# Patient Record
Sex: MALE | Race: WHITE | NOT HISPANIC OR LATINO | Employment: OTHER | URBAN - METROPOLITAN AREA
[De-identification: names, ages, dates, MRNs, and addresses within clinical notes are randomized per-mention and may not be internally consistent; named-entity substitution may affect disease eponyms.]

---

## 2017-02-01 ENCOUNTER — APPOINTMENT (OUTPATIENT)
Dept: GENERAL RADIOLOGY | Facility: HOSPITAL | Age: 82
End: 2017-02-01

## 2017-02-01 ENCOUNTER — HOSPITAL ENCOUNTER (INPATIENT)
Facility: HOSPITAL | Age: 82
LOS: 3 days | Discharge: HOME | End: 2017-02-04
Attending: EMERGENCY MEDICINE | Admitting: INTERNAL MEDICINE

## 2017-02-01 ENCOUNTER — APPOINTMENT (OUTPATIENT)
Dept: CT IMAGING | Facility: HOSPITAL | Age: 82
End: 2017-02-01

## 2017-02-01 DIAGNOSIS — J44.1 COPD EXACERBATION (HCC): Primary | ICD-10-CM

## 2017-02-01 DIAGNOSIS — I20.0 UNSTABLE ANGINA (HCC): ICD-10-CM

## 2017-02-01 DIAGNOSIS — R09.02 HYPOXIA: ICD-10-CM

## 2017-02-01 PROBLEM — N18.9 ACUTE KIDNEY INJURY SUPERIMPOSED ON CHRONIC KIDNEY DISEASE (HCC): Status: ACTIVE | Noted: 2017-02-01

## 2017-02-01 PROBLEM — K21.9 GERD (GASTROESOPHAGEAL REFLUX DISEASE): Status: ACTIVE | Noted: 2017-02-01

## 2017-02-01 PROBLEM — R55 SYNCOPE: Status: ACTIVE | Noted: 2017-02-01

## 2017-02-01 PROBLEM — N17.9 AKI (ACUTE KIDNEY INJURY) (HCC): Status: ACTIVE | Noted: 2017-02-01

## 2017-02-01 PROBLEM — E78.5 HYPERLIPIDEMIA: Status: ACTIVE | Noted: 2017-02-01

## 2017-02-01 PROBLEM — N17.9 ACUTE KIDNEY INJURY SUPERIMPOSED ON CHRONIC KIDNEY DISEASE (HCC): Status: ACTIVE | Noted: 2017-02-01

## 2017-02-01 PROBLEM — I10 HYPERTENSION: Status: ACTIVE | Noted: 2017-02-01

## 2017-02-01 PROBLEM — Z87.891 HISTORY OF TOBACCO ABUSE: Status: ACTIVE | Noted: 2017-02-01

## 2017-02-01 LAB
ALBUMIN SERPL-MCNC: 4.2 G/DL (ref 3.2–4.8)
ALBUMIN/GLOB SERPL: 1.6 G/DL (ref 1.5–2.5)
ALP SERPL-CCNC: 41 U/L (ref 25–100)
ALT SERPL W P-5'-P-CCNC: 21 U/L (ref 7–40)
ANION GAP SERPL CALCULATED.3IONS-SCNC: 7 MMOL/L (ref 3–11)
AST SERPL-CCNC: 30 U/L (ref 0–33)
BASOPHILS # BLD AUTO: 0.01 10*3/MM3 (ref 0–0.2)
BASOPHILS NFR BLD AUTO: 0.2 % (ref 0–1)
BILIRUB SERPL-MCNC: 0.4 MG/DL (ref 0.3–1.2)
BNP SERPL-MCNC: 73 PG/ML (ref 0–100)
BUN BLD-MCNC: 32 MG/DL (ref 9–23)
BUN/CREAT SERPL: 21.3 (ref 7–25)
CALCIUM SPEC-SCNC: 9.8 MG/DL (ref 8.7–10.4)
CHLORIDE SERPL-SCNC: 102 MMOL/L (ref 99–109)
CO2 SERPL-SCNC: 29 MMOL/L (ref 20–31)
CREAT BLD-MCNC: 1.5 MG/DL (ref 0.6–1.3)
D DIMER PPP FEU-MCNC: 1.23 MG/L (FEU) (ref 0–0.5)
DEPRECATED RDW RBC AUTO: 45.3 FL (ref 37–54)
EOSINOPHIL # BLD AUTO: 0.12 10*3/MM3 (ref 0.1–0.3)
EOSINOPHIL NFR BLD AUTO: 2.3 % (ref 0–3)
ERYTHROCYTE [DISTWIDTH] IN BLOOD BY AUTOMATED COUNT: 13.5 % (ref 11.3–14.5)
GFR SERPL CREATININE-BSD FRML MDRD: 45 ML/MIN/1.73
GLOBULIN UR ELPH-MCNC: 2.7 GM/DL
GLUCOSE BLD-MCNC: 96 MG/DL (ref 70–100)
HCT VFR BLD AUTO: 42.6 % (ref 38.9–50.9)
HGB BLD-MCNC: 13.6 G/DL (ref 13.1–17.5)
HOLD SPECIMEN: NORMAL
HOLD SPECIMEN: NORMAL
IMM GRANULOCYTES # BLD: 0.01 10*3/MM3 (ref 0–0.03)
IMM GRANULOCYTES NFR BLD: 0.2 % (ref 0–0.6)
LIPASE SERPL-CCNC: 48 U/L (ref 6–51)
LYMPHOCYTES # BLD AUTO: 0.65 10*3/MM3 (ref 0.6–4.8)
LYMPHOCYTES NFR BLD AUTO: 12.5 % (ref 24–44)
MCH RBC QN AUTO: 29.2 PG (ref 27–31)
MCHC RBC AUTO-ENTMCNC: 31.9 G/DL (ref 32–36)
MCV RBC AUTO: 91.6 FL (ref 80–99)
MONOCYTES # BLD AUTO: 0.42 10*3/MM3 (ref 0–1)
MONOCYTES NFR BLD AUTO: 8.1 % (ref 0–12)
NEUTROPHILS # BLD AUTO: 4 10*3/MM3 (ref 1.5–8.3)
NEUTROPHILS NFR BLD AUTO: 76.7 % (ref 41–71)
PLATELET # BLD AUTO: 210 10*3/MM3 (ref 150–450)
PMV BLD AUTO: 9.7 FL (ref 6–12)
POTASSIUM BLD-SCNC: 5.1 MMOL/L (ref 3.5–5.5)
PROT SERPL-MCNC: 6.9 G/DL (ref 5.7–8.2)
RBC # BLD AUTO: 4.65 10*6/MM3 (ref 4.2–5.76)
SODIUM BLD-SCNC: 138 MMOL/L (ref 132–146)
TROPONIN I SERPL-MCNC: 0.01 NG/ML (ref 0–0.07)
TROPONIN I SERPL-MCNC: <0.006 NG/ML
TROPONIN I SERPL-MCNC: <0.006 NG/ML
WBC NRBC COR # BLD: 5.21 10*3/MM3 (ref 3.5–10.8)
WHOLE BLOOD HOLD SPECIMEN: NORMAL
WHOLE BLOOD HOLD SPECIMEN: NORMAL

## 2017-02-01 PROCEDURE — 25010000002 HEPARIN (PORCINE) PER 1000 UNITS: Performed by: NURSE PRACTITIONER

## 2017-02-01 PROCEDURE — 0 IOPAMIDOL PER 1 ML: Performed by: EMERGENCY MEDICINE

## 2017-02-01 PROCEDURE — 84484 ASSAY OF TROPONIN QUANT: CPT | Performed by: PHYSICIAN ASSISTANT

## 2017-02-01 PROCEDURE — 85025 COMPLETE CBC W/AUTO DIFF WBC: CPT | Performed by: EMERGENCY MEDICINE

## 2017-02-01 PROCEDURE — 93005 ELECTROCARDIOGRAM TRACING: CPT | Performed by: EMERGENCY MEDICINE

## 2017-02-01 PROCEDURE — 84484 ASSAY OF TROPONIN QUANT: CPT

## 2017-02-01 PROCEDURE — 83880 ASSAY OF NATRIURETIC PEPTIDE: CPT | Performed by: EMERGENCY MEDICINE

## 2017-02-01 PROCEDURE — 36415 COLL VENOUS BLD VENIPUNCTURE: CPT

## 2017-02-01 PROCEDURE — 25010000002 METHYLPREDNISOLONE PER 40 MG: Performed by: PHYSICIAN ASSISTANT

## 2017-02-01 PROCEDURE — 71275 CT ANGIOGRAPHY CHEST: CPT

## 2017-02-01 PROCEDURE — 25010000002 METHYLPREDNISOLONE PER 125 MG: Performed by: NURSE PRACTITIONER

## 2017-02-01 PROCEDURE — 99223 1ST HOSP IP/OBS HIGH 75: CPT | Performed by: INTERNAL MEDICINE

## 2017-02-01 PROCEDURE — 80053 COMPREHEN METABOLIC PANEL: CPT | Performed by: EMERGENCY MEDICINE

## 2017-02-01 PROCEDURE — 94640 AIRWAY INHALATION TREATMENT: CPT

## 2017-02-01 PROCEDURE — 84484 ASSAY OF TROPONIN QUANT: CPT | Performed by: NURSE PRACTITIONER

## 2017-02-01 PROCEDURE — 71010 HC CHEST PA OR AP: CPT

## 2017-02-01 PROCEDURE — 83690 ASSAY OF LIPASE: CPT | Performed by: EMERGENCY MEDICINE

## 2017-02-01 PROCEDURE — 25010000002 ONDANSETRON PER 1 MG: Performed by: PHYSICIAN ASSISTANT

## 2017-02-01 PROCEDURE — 85379 FIBRIN DEGRADATION QUANT: CPT | Performed by: PHYSICIAN ASSISTANT

## 2017-02-01 PROCEDURE — 99285 EMERGENCY DEPT VISIT HI MDM: CPT

## 2017-02-01 PROCEDURE — 94760 N-INVAS EAR/PLS OXIMETRY 1: CPT

## 2017-02-01 RX ORDER — SODIUM CHLORIDE 9 MG/ML
100 INJECTION, SOLUTION INTRAVENOUS CONTINUOUS
Status: DISCONTINUED | OUTPATIENT
Start: 2017-02-01 | End: 2017-02-02

## 2017-02-01 RX ORDER — ONDANSETRON 2 MG/ML
4 INJECTION INTRAMUSCULAR; INTRAVENOUS EVERY 6 HOURS PRN
Status: DISCONTINUED | OUTPATIENT
Start: 2017-02-01 | End: 2017-02-04 | Stop reason: HOSPADM

## 2017-02-01 RX ORDER — ASPIRIN 81 MG/1
81 TABLET ORAL DAILY
Status: DISCONTINUED | OUTPATIENT
Start: 2017-02-02 | End: 2017-02-04 | Stop reason: HOSPADM

## 2017-02-01 RX ORDER — HEPARIN SODIUM 5000 [USP'U]/ML
5000 INJECTION, SOLUTION INTRAVENOUS; SUBCUTANEOUS EVERY 8 HOURS SCHEDULED
Status: DISCONTINUED | OUTPATIENT
Start: 2017-02-01 | End: 2017-02-02

## 2017-02-01 RX ORDER — IPRATROPIUM BROMIDE AND ALBUTEROL SULFATE 2.5; .5 MG/3ML; MG/3ML
3 SOLUTION RESPIRATORY (INHALATION) EVERY 4 HOURS PRN
Status: DISCONTINUED | OUTPATIENT
Start: 2017-02-01 | End: 2017-02-04 | Stop reason: HOSPADM

## 2017-02-01 RX ORDER — HYDROMORPHONE HYDROCHLORIDE 1 MG/ML
0.5 INJECTION, SOLUTION INTRAMUSCULAR; INTRAVENOUS; SUBCUTANEOUS ONCE
Status: DISCONTINUED | OUTPATIENT
Start: 2017-02-01 | End: 2017-02-01

## 2017-02-01 RX ORDER — SODIUM CHLORIDE 9 MG/ML
125 INJECTION, SOLUTION INTRAVENOUS CONTINUOUS
Status: DISCONTINUED | OUTPATIENT
Start: 2017-02-01 | End: 2017-02-04 | Stop reason: HOSPADM

## 2017-02-01 RX ORDER — IPRATROPIUM BROMIDE AND ALBUTEROL SULFATE 2.5; .5 MG/3ML; MG/3ML
3 SOLUTION RESPIRATORY (INHALATION)
Status: DISCONTINUED | OUTPATIENT
Start: 2017-02-01 | End: 2017-02-04 | Stop reason: HOSPADM

## 2017-02-01 RX ORDER — OMEPRAZOLE 20 MG/1
20 CAPSULE, DELAYED RELEASE ORAL DAILY
COMMUNITY

## 2017-02-01 RX ORDER — ASPIRIN 81 MG/1
81 TABLET ORAL DAILY
Status: ON HOLD | COMMUNITY
End: 2017-02-03

## 2017-02-01 RX ORDER — ONDANSETRON 2 MG/ML
4 INJECTION INTRAMUSCULAR; INTRAVENOUS ONCE
Status: COMPLETED | OUTPATIENT
Start: 2017-02-01 | End: 2017-02-01

## 2017-02-01 RX ORDER — ASPIRIN 81 MG/1
324 TABLET, CHEWABLE ORAL ONCE
Status: COMPLETED | OUTPATIENT
Start: 2017-02-01 | End: 2017-02-01

## 2017-02-01 RX ORDER — IPRATROPIUM BROMIDE AND ALBUTEROL SULFATE 2.5; .5 MG/3ML; MG/3ML
3 SOLUTION RESPIRATORY (INHALATION) ONCE
Status: COMPLETED | OUTPATIENT
Start: 2017-02-01 | End: 2017-02-01

## 2017-02-01 RX ORDER — SIMVASTATIN 40 MG
40 TABLET ORAL NIGHTLY
COMMUNITY
End: 2017-02-04 | Stop reason: HOSPADM

## 2017-02-01 RX ORDER — LISINOPRIL 20 MG/1
20 TABLET ORAL DAILY
Status: ON HOLD | COMMUNITY
End: 2017-02-20

## 2017-02-01 RX ORDER — SODIUM CHLORIDE 0.9 % (FLUSH) 0.9 %
1-10 SYRINGE (ML) INJECTION AS NEEDED
Status: DISCONTINUED | OUTPATIENT
Start: 2017-02-01 | End: 2017-02-04 | Stop reason: HOSPADM

## 2017-02-01 RX ORDER — PANTOPRAZOLE SODIUM 40 MG/1
40 TABLET, DELAYED RELEASE ORAL
Status: DISCONTINUED | OUTPATIENT
Start: 2017-02-02 | End: 2017-02-04 | Stop reason: HOSPADM

## 2017-02-01 RX ORDER — ATORVASTATIN CALCIUM 20 MG/1
20 TABLET, FILM COATED ORAL DAILY
Status: DISCONTINUED | OUTPATIENT
Start: 2017-02-02 | End: 2017-02-01

## 2017-02-01 RX ORDER — ATORVASTATIN CALCIUM 40 MG/1
80 TABLET, FILM COATED ORAL DAILY
Status: DISCONTINUED | OUTPATIENT
Start: 2017-02-02 | End: 2017-02-03

## 2017-02-01 RX ORDER — METHYLPREDNISOLONE SODIUM SUCCINATE 40 MG/ML
80 INJECTION, POWDER, LYOPHILIZED, FOR SOLUTION INTRAMUSCULAR; INTRAVENOUS ONCE
Status: COMPLETED | OUTPATIENT
Start: 2017-02-01 | End: 2017-02-01

## 2017-02-01 RX ORDER — METHYLPREDNISOLONE SODIUM SUCCINATE 125 MG/2ML
40 INJECTION, POWDER, LYOPHILIZED, FOR SOLUTION INTRAMUSCULAR; INTRAVENOUS EVERY 8 HOURS
Status: DISCONTINUED | OUTPATIENT
Start: 2017-02-01 | End: 2017-02-02

## 2017-02-01 RX ORDER — SODIUM CHLORIDE 0.9 % (FLUSH) 0.9 %
10 SYRINGE (ML) INJECTION AS NEEDED
Status: DISCONTINUED | OUTPATIENT
Start: 2017-02-01 | End: 2017-02-04 | Stop reason: HOSPADM

## 2017-02-01 RX ORDER — ACETAMINOPHEN 325 MG/1
650 TABLET ORAL EVERY 6 HOURS PRN
Status: DISCONTINUED | OUTPATIENT
Start: 2017-02-01 | End: 2017-02-04 | Stop reason: HOSPADM

## 2017-02-01 RX ADMIN — ONDANSETRON 4 MG: 2 INJECTION INTRAMUSCULAR; INTRAVENOUS at 18:06

## 2017-02-01 RX ADMIN — ASPIRIN 81 MG 324 MG: 81 TABLET ORAL at 14:25

## 2017-02-01 RX ADMIN — SODIUM CHLORIDE 1000 ML: 9 INJECTION, SOLUTION INTRAVENOUS at 15:52

## 2017-02-01 RX ADMIN — IOPAMIDOL 50 ML: 755 INJECTION, SOLUTION INTRAVENOUS at 16:26

## 2017-02-01 RX ADMIN — SODIUM CHLORIDE 100 ML/HR: 9 INJECTION, SOLUTION INTRAVENOUS at 22:23

## 2017-02-01 RX ADMIN — ALBUTEROL SULFATE 2.5 MG: 2.5 SOLUTION RESPIRATORY (INHALATION) at 18:40

## 2017-02-01 RX ADMIN — METHYLPREDNISOLONE SODIUM SUCCINATE 40 MG: 125 INJECTION, POWDER, FOR SOLUTION INTRAMUSCULAR; INTRAVENOUS at 22:22

## 2017-02-01 RX ADMIN — SODIUM CHLORIDE 125 ML/HR: 9 INJECTION, SOLUTION INTRAVENOUS at 17:59

## 2017-02-01 RX ADMIN — IPRATROPIUM BROMIDE AND ALBUTEROL SULFATE 3 ML: .5; 3 SOLUTION RESPIRATORY (INHALATION) at 17:14

## 2017-02-01 RX ADMIN — METHYLPREDNISOLONE SODIUM SUCCINATE 80 MG: 40 INJECTION, POWDER, FOR SOLUTION INTRAMUSCULAR; INTRAVENOUS at 18:05

## 2017-02-01 RX ADMIN — HEPARIN SODIUM 5000 UNITS: 5000 INJECTION, SOLUTION INTRAVENOUS; SUBCUTANEOUS at 22:22

## 2017-02-01 NOTE — ED PROVIDER NOTES
Subjective   HPI Comments: Patient does not have a history of COPD he is aware of.  He quit smoking about 15 years ago.    Patient is a 81 y.o. male presenting with shortness of breath.   History provided by:  Patient   used: No    Shortness of Breath   Severity:  Moderate  Onset quality:  Gradual  Duration:  5 days  Timing:  Intermittent  Progression:  Waxing and waning  Chronicity:  New  Context: activity and URI    Relieved by:  Rest  Worsened by:  Exertion  Ineffective treatments:  None tried  Associated symptoms: chest pain (but pain is random and nothing increases or decreases pain. ), cough (productive with little blood), hemoptysis (small amount bright red. ) and wheezing    Associated symptoms: no abdominal pain, no diaphoresis, no ear pain, no headaches, no rash, no sputum production, no swollen glands and no vomiting        Review of Systems   Constitutional: Negative for diaphoresis.   HENT: Negative for ear pain.    Respiratory: Positive for cough (productive with little blood), hemoptysis (small amount bright red. ) and shortness of breath. Negative for sputum production.    Cardiovascular: Positive for chest pain (but pain is random and nothing increases or decreases pain. ).   Gastrointestinal: Negative for abdominal pain, blood in stool, diarrhea, nausea and vomiting.   Genitourinary: Negative for dysuria, frequency, hematuria and urgency.   Skin: Negative for rash.   Neurological: Negative for tremors, syncope and headaches.   Psychiatric/Behavioral: Negative.        Past Medical History   Diagnosis Date   • GERD (gastroesophageal reflux disease)    • Hypertension        No Known Allergies    Past Surgical History   Procedure Laterality Date   • Abdominal surgery         History reviewed. No pertinent family history.    Social History     Social History   • Marital status:      Spouse name: N/A   • Number of children: N/A   • Years of education: N/A     Social History  Main Topics   • Smoking status: Former Smoker   • Smokeless tobacco: None      Comment: QUIT 15 YEARS AGO   • Alcohol use No   • Drug use: No   • Sexual activity: Not Asked     Other Topics Concern   • None     Social History Narrative   • None           Objective   Physical Exam   Constitutional: He is oriented to person, place, and time. He appears well-developed and well-nourished.   Thin cachetic   HENT:   Head: Normocephalic and atraumatic.   Right Ear: External ear normal.   Left Ear: External ear normal.   Nose: Nose normal.   Mouth/Throat: Oropharynx is clear and moist.   Eyes: Conjunctivae and EOM are normal. Pupils are equal, round, and reactive to light. No scleral icterus.   Neck: Normal range of motion. No thyromegaly present.   Cardiovascular: Normal rate, regular rhythm, normal heart sounds and intact distal pulses.    Pulmonary/Chest: No respiratory distress. He has wheezes. He has no rales. He exhibits no tenderness.   Abdominal: Soft. Bowel sounds are normal. He exhibits no distension. There is no tenderness.   Musculoskeletal: Normal range of motion.   Lymphadenopathy:     He has no cervical adenopathy.   Neurological: He is alert and oriented to person, place, and time. He has normal reflexes. He displays normal reflexes. No cranial nerve deficit. Coordination normal.   Skin: Skin is warm and dry.   Psychiatric: He has a normal mood and affect. His behavior is normal. Judgment and thought content normal.   Nursing note and vitals reviewed.      Procedures         ED Course  ED Course   Value Comment By Time   XR Chest 1 View (Reviewed) Zach Davalos MD 02/01 1486   XR Chest 1 View (Reviewed) Zach Davalos MD 02/01 3510        Recent Results (from the past 24 hour(s))   Comprehensive Metabolic Panel    Collection Time: 02/01/17  2:08 PM   Result Value Ref Range    Glucose 96 70 - 100 mg/dL    BUN 32 (H) 9 - 23 mg/dL    Creatinine 1.50 (H) 0.60 - 1.30 mg/dL    Sodium 138 132 - 146 mmol/L     Potassium 5.1 3.5 - 5.5 mmol/L    Chloride 102 99 - 109 mmol/L    CO2 29.0 20.0 - 31.0 mmol/L    Calcium 9.8 8.7 - 10.4 mg/dL    Total Protein 6.9 5.7 - 8.2 g/dL    Albumin 4.20 3.20 - 4.80 g/dL    ALT (SGPT) 21 7 - 40 U/L    AST (SGOT) 30 0 - 33 U/L    Alkaline Phosphatase 41 25 - 100 U/L    Total Bilirubin 0.4 0.3 - 1.2 mg/dL    eGFR Non African Amer 45 (L) >60 mL/min/1.73    Globulin 2.7 gm/dL    A/G Ratio 1.6 1.5 - 2.5 g/dL    BUN/Creatinine Ratio 21.3 7.0 - 25.0    Anion Gap 7.0 3.0 - 11.0 mmol/L   Lipase    Collection Time: 02/01/17  2:08 PM   Result Value Ref Range    Lipase 48 6 - 51 U/L   BNP    Collection Time: 02/01/17  2:08 PM   Result Value Ref Range    BNP 73.0 0.0 - 100.0 pg/mL   CBC Auto Differential    Collection Time: 02/01/17  2:08 PM   Result Value Ref Range    WBC 5.21 3.50 - 10.80 10*3/mm3    RBC 4.65 4.20 - 5.76 10*6/mm3    Hemoglobin 13.6 13.1 - 17.5 g/dL    Hematocrit 42.6 38.9 - 50.9 %    MCV 91.6 80.0 - 99.0 fL    MCH 29.2 27.0 - 31.0 pg    MCHC 31.9 (L) 32.0 - 36.0 g/dL    RDW 13.5 11.3 - 14.5 %    RDW-SD 45.3 37.0 - 54.0 fl    MPV 9.7 6.0 - 12.0 fL    Platelets 210 150 - 450 10*3/mm3    Neutrophil % 76.7 (H) 41.0 - 71.0 %    Lymphocyte % 12.5 (L) 24.0 - 44.0 %    Monocyte % 8.1 0.0 - 12.0 %    Eosinophil % 2.3 0.0 - 3.0 %    Basophil % 0.2 0.0 - 1.0 %    Immature Grans % 0.2 0.0 - 0.6 %    Neutrophils, Absolute 4.00 1.50 - 8.30 10*3/mm3    Lymphocytes, Absolute 0.65 0.60 - 4.80 10*3/mm3    Monocytes, Absolute 0.42 0.00 - 1.00 10*3/mm3    Eosinophils, Absolute 0.12 0.10 - 0.30 10*3/mm3    Basophils, Absolute 0.01 0.00 - 0.20 10*3/mm3    Immature Grans, Absolute 0.01 0.00 - 0.03 10*3/mm3   D-dimer, Quantitative    Collection Time: 02/01/17  2:08 PM   Result Value Ref Range    D-Dimer, Quantitative 1.23 (H) 0.00 - 0.50 mg/L (FEU)   Troponin    Collection Time: 02/01/17  2:08 PM   Result Value Ref Range    Troponin I <0.006 <=0.040 ng/mL   POC Troponin, Rapid    Collection Time: 02/01/17   5:23 PM   Result Value Ref Range    Troponin I 0.01 0.00 - 0.07 ng/mL     Note: In addition to lab results from this visit, the labs listed above may include labs taken at another facility or during a different encounter within the last 24 hours. Please correlate lab times with ED admission and discharge times for further clarification of the services performed during this visit.    XR Chest 1 View   Final Result   Chronic change; no active disease.       D:  02/01/2017   E:  02/01/2017           This report was finalized on 2/1/2017 4:24 PM by Dr. Aniket Singh MD.          CT Angiogram Chest With Contrast    (Results Pending)     Vitals:    02/01/17 1715 02/01/17 1730 02/01/17 1745 02/01/17 1746   BP: 138/66 128/67 139/63    BP Location:       Patient Position:       Pulse: 68 68  68   Resp:       Temp:       TempSrc:       SpO2: 94% 91%  (!) 87%   Weight:       Height:         Medications   sodium chloride 0.9 % flush 10 mL (not administered)   sodium chloride 0.9 % infusion (125 mL/hr Intravenous New Bag 2/1/17 1759)   albuterol (PROVENTIL) nebulizer solution 0.5% 2.5 mg/0.5mL (not administered)   aspirin chewable tablet 324 mg (324 mg Oral Given 2/1/17 1425)   sodium chloride 0.9 % bolus 1,000 mL (0 mL Intravenous Stopped 2/1/17 1743)   iopamidol (ISOVUE-370) 76 % injection 50 mL (50 mL Intravenous Given 2/1/17 1626)   methylPREDNISolone sodium succinate (SOLU-Medrol) injection 80 mg (80 mg Intravenous Given 2/1/17 1805)   ipratropium-albuterol (DUO-NEB) nebulizer solution 3 mL (3 mL Nebulization Given 2/1/17 1714)   ondansetron (ZOFRAN) injection 4 mg (4 mg Intravenous Given 2/1/17 1806)     ECG/EMG Results (last 24 hours)     Procedure Component Value Units Date/Time    ECG 12 Lead [[id number]] Collected:  02/01/17 1712     Updated:  02/01/17 1714    ECG 12 Lead [[id number]] Collected:  02/01/17 1406     Updated:  02/01/17 1716    Narrative:       Test Reason : cp  Blood Pressure : **/** mmHG  Vent. Rate : 068 BPM      Atrial Rate : 068 BPM     P-R Int : 162 ms          QRS Dur : 076 ms      QT Int : 416 ms       P-R-T Axes : 081 -01 056 degrees     QTc Int : 442 ms    Normal sinus rhythm  Normal ECG  No previous ECGs available  Confirmed by TRAVIS AUGUSTE MD ([id number]) on 2/1/2017 5:15:58 PM    Referred By:  KELVIN LEMUS           Confirmed By:TRAVIS AUGUSTE MD                  MDM  Number of Diagnoses or Management Options  COPD exacerbation: new and requires workup  Hypoxia: new and requires workup     Amount and/or Complexity of Data Reviewed  Clinical lab tests: reviewed and ordered  Tests in the radiology section of CPT®: reviewed and ordered  Tests in the medicine section of CPT®: reviewed and ordered  Discuss the patient with other providers: yes    Patient Progress  Patient progress: stable      Final diagnoses:   None            ELIAS Art  02/02/17 5540

## 2017-02-01 NOTE — ED PROVIDER NOTES
Subjective   History of Present Illness    Review of Systems    No past medical history on file.    Allergies not on file    No past surgical history on file.    No family history on file.    Social History     Social History   • Marital status: N/A     Spouse name: N/A   • Number of children: N/A   • Years of education: N/A     Social History Main Topics   • Smoking status: Not on file   • Smokeless tobacco: Not on file   • Alcohol use Not on file   • Drug use: Not on file   • Sexual activity: Not on file     Other Topics Concern   • Not on file     Social History Narrative         Objective   Physical Exam    Procedures         ED Course  ED Course                     MDM    Final diagnoses:   None       Documentation assistance provided by nayan Eubanks.  Information recorded by the scribe was done at my direction and has been verified and validated by me.     Micky Eubanks  02/01/17 8978

## 2017-02-02 ENCOUNTER — APPOINTMENT (OUTPATIENT)
Dept: CARDIOLOGY | Facility: HOSPITAL | Age: 82
End: 2017-02-02

## 2017-02-02 PROBLEM — I25.110 CORONARY ARTERY DISEASE INVOLVING NATIVE CORONARY ARTERY OF NATIVE HEART WITH UNSTABLE ANGINA PECTORIS (HCC): Status: ACTIVE | Noted: 2017-02-02

## 2017-02-02 PROBLEM — N17.9 AKI (ACUTE KIDNEY INJURY) (HCC): Status: RESOLVED | Noted: 2017-02-01 | Resolved: 2017-02-02

## 2017-02-02 PROBLEM — I20.0 UNSTABLE ANGINA (HCC): Status: ACTIVE | Noted: 2017-02-02

## 2017-02-02 PROBLEM — I20.0 UNSTABLE ANGINA (HCC): Status: RESOLVED | Noted: 2017-02-02 | Resolved: 2017-02-02

## 2017-02-02 PROBLEM — R07.2 PRECORDIAL PAIN: Status: ACTIVE | Noted: 2017-02-02

## 2017-02-02 LAB
ACT BLD: 229 SECONDS (ref 82–152)
ACT BLD: 239 SECONDS (ref 82–152)
ANION GAP SERPL CALCULATED.3IONS-SCNC: 6 MMOL/L (ref 3–11)
ARTICHOKE IGE QN: 88 MG/DL (ref 0–130)
BASOPHILS # BLD AUTO: 0 10*3/MM3 (ref 0–0.2)
BASOPHILS NFR BLD AUTO: 0 % (ref 0–1)
BH CV ECHO MEAS - AO MAX PG (FULL): 6.8 MMHG
BH CV ECHO MEAS - AO MAX PG: 12.1 MMHG
BH CV ECHO MEAS - AO MEAN PG (FULL): 4 MMHG
BH CV ECHO MEAS - AO MEAN PG: 6 MMHG
BH CV ECHO MEAS - AO ROOT AREA (BSA CORRECTED): 1.7
BH CV ECHO MEAS - AO ROOT AREA: 7.5 CM^2
BH CV ECHO MEAS - AO ROOT DIAM: 3.1 CM
BH CV ECHO MEAS - AO V2 MAX: 174 CM/SEC
BH CV ECHO MEAS - AO V2 MEAN: 111 CM/SEC
BH CV ECHO MEAS - AO V2 VTI: 37.4 CM
BH CV ECHO MEAS - AVA(I,A): 2.2 CM^2
BH CV ECHO MEAS - AVA(I,D): 2.2 CM^2
BH CV ECHO MEAS - AVA(V,A): 2.3 CM^2
BH CV ECHO MEAS - AVA(V,D): 2.3 CM^2
BH CV ECHO MEAS - BSA(HAYCOCK): 1.8 M^2
BH CV ECHO MEAS - BSA: 1.8 M^2
BH CV ECHO MEAS - BZI_BMI: 20.5 KILOGRAMS/M^2
BH CV ECHO MEAS - BZI_METRIC_HEIGHT: 177.8 CM
BH CV ECHO MEAS - BZI_METRIC_WEIGHT: 64.9 KG
BH CV ECHO MEAS - CONTRAST EF (2CH): 78 ML/M^2
BH CV ECHO MEAS - CONTRAST EF 4CH: 71.6 ML/M^2
BH CV ECHO MEAS - EDV(CUBED): 92.3 ML
BH CV ECHO MEAS - EDV(MOD-SP2): 82 ML
BH CV ECHO MEAS - EDV(MOD-SP4): 74 ML
BH CV ECHO MEAS - EDV(TEICH): 93.4 ML
BH CV ECHO MEAS - EF(CUBED): 66.8 %
BH CV ECHO MEAS - EF(MOD-SP2): 78 %
BH CV ECHO MEAS - EF(MOD-SP4): 71.6 %
BH CV ECHO MEAS - EF(TEICH): 58.4 %
BH CV ECHO MEAS - ESV(CUBED): 30.7 ML
BH CV ECHO MEAS - ESV(MOD-SP2): 18 ML
BH CV ECHO MEAS - ESV(MOD-SP4): 21 ML
BH CV ECHO MEAS - ESV(TEICH): 38.8 ML
BH CV ECHO MEAS - FS: 30.8 %
BH CV ECHO MEAS - IVS/LVPW: 1.2
BH CV ECHO MEAS - IVSD: 1.1 CM
BH CV ECHO MEAS - LA DIMENSION: 3.6 CM
BH CV ECHO MEAS - LA/AO: 1.2
BH CV ECHO MEAS - LAT PEAK E' VEL: 9 CM/SEC
BH CV ECHO MEAS - LV DIASTOLIC VOL/BSA (35-75): 40.9 ML/M^2
BH CV ECHO MEAS - LV MASS(C)D: 162.3 GRAMS
BH CV ECHO MEAS - LV MASS(C)DI: 89.7 GRAMS/M^2
BH CV ECHO MEAS - LV MAX PG: 5.3 MMHG
BH CV ECHO MEAS - LV MEAN PG: 2 MMHG
BH CV ECHO MEAS - LV SYSTOLIC VOL/BSA (12-30): 11.6 ML/M^2
BH CV ECHO MEAS - LV V1 MAX: 115 CM/SEC
BH CV ECHO MEAS - LV V1 MEAN: 65.8 CM/SEC
BH CV ECHO MEAS - LV V1 VTI: 23.5 CM
BH CV ECHO MEAS - LVIDD: 4.5 CM
BH CV ECHO MEAS - LVIDS: 3.1 CM
BH CV ECHO MEAS - LVLD AP2: 7.9 CM
BH CV ECHO MEAS - LVLD AP4: 7.7 CM
BH CV ECHO MEAS - LVLS AP2: 5.9 CM
BH CV ECHO MEAS - LVLS AP4: 5.8 CM
BH CV ECHO MEAS - LVOT AREA (M): 3.5 CM^2
BH CV ECHO MEAS - LVOT AREA: 3.5 CM^2
BH CV ECHO MEAS - LVOT DIAM: 2.1 CM
BH CV ECHO MEAS - LVPWD: 0.96 CM
BH CV ECHO MEAS - MED PEAK E' VEL: 7.17 CM/SEC
BH CV ECHO MEAS - MV A MAX VEL: 109 CM/SEC
BH CV ECHO MEAS - MV DEC TIME: 0.24 SEC
BH CV ECHO MEAS - MV E MAX VEL: 92.1 CM/SEC
BH CV ECHO MEAS - MV E/A: 0.84
BH CV ECHO MEAS - PA ACC SLOPE: 960 CM/SEC^2
BH CV ECHO MEAS - PA ACC TIME: 0.09 SEC
BH CV ECHO MEAS - PA MAX PG: 3 MMHG
BH CV ECHO MEAS - PA PR(ACCEL): 39.9 MMHG
BH CV ECHO MEAS - PA V2 MAX: 86.8 CM/SEC
BH CV ECHO MEAS - PULM DIAS VEL: 45.9 CM/SEC
BH CV ECHO MEAS - PULM S/D: 1.3
BH CV ECHO MEAS - PULM SYS VEL: 59.6 CM/SEC
BH CV ECHO MEAS - RVDD: 3.6 CM
BH CV ECHO MEAS - SI(AO): 156 ML/M^2
BH CV ECHO MEAS - SI(CUBED): 34.1 ML/M^2
BH CV ECHO MEAS - SI(LVOT): 45 ML/M^2
BH CV ECHO MEAS - SI(MOD-SP2): 35.4 ML/M^2
BH CV ECHO MEAS - SI(MOD-SP4): 29.3 ML/M^2
BH CV ECHO MEAS - SI(TEICH): 30.2 ML/M^2
BH CV ECHO MEAS - SV(AO): 282.3 ML
BH CV ECHO MEAS - SV(CUBED): 61.7 ML
BH CV ECHO MEAS - SV(LVOT): 81.4 ML
BH CV ECHO MEAS - SV(MOD-SP2): 64 ML
BH CV ECHO MEAS - SV(MOD-SP4): 53 ML
BH CV ECHO MEAS - SV(TEICH): 54.6 ML
BH CV ECHO MEAS - TAPSE (>1.6): 2.2 CM2
BH CV ECHO MEAS - TR MAX VEL: 262.5 CM/SEC
BH CV XLRA - RV BASE: 4.3 CM
BH CV XLRA - RV LENGTH: 7.5 CM
BH CV XLRA - RV MID: 3.9 CM
BUN BLD-MCNC: 25 MG/DL (ref 9–23)
BUN/CREAT SERPL: 25 (ref 7–25)
CALCIUM SPEC-SCNC: 8.9 MG/DL (ref 8.7–10.4)
CHLORIDE SERPL-SCNC: 108 MMOL/L (ref 99–109)
CHOLEST SERPL-MCNC: 143 MG/DL (ref 0–200)
CO2 SERPL-SCNC: 27 MMOL/L (ref 20–31)
CREAT BLD-MCNC: 1 MG/DL (ref 0.6–1.3)
CREAT UR-MCNC: 78.8 MG/DL
DEPRECATED RDW RBC AUTO: 44.7 FL (ref 37–54)
E/E' RATIO: 11.5
EOSINOPHIL # BLD AUTO: 0 10*3/MM3 (ref 0.1–0.3)
EOSINOPHIL NFR BLD AUTO: 0 % (ref 0–3)
ERYTHROCYTE [DISTWIDTH] IN BLOOD BY AUTOMATED COUNT: 13.3 % (ref 11.3–14.5)
GFR SERPL CREATININE-BSD FRML MDRD: 72 ML/MIN/1.73
GLUCOSE BLD-MCNC: 150 MG/DL (ref 70–100)
HBA1C MFR BLD: 6.6 % (ref 4.8–5.6)
HCT VFR BLD AUTO: 42.2 % (ref 38.9–50.9)
HDLC SERPL-MCNC: 46 MG/DL (ref 40–60)
HGB BLD-MCNC: 13.4 G/DL (ref 13.1–17.5)
IMM GRANULOCYTES # BLD: 0 10*3/MM3 (ref 0–0.03)
IMM GRANULOCYTES NFR BLD: 0 % (ref 0–0.6)
INR PPP: 1.02
LEFT ATRIUM VOLUME INDEX: 27.1 ML/M2
LV EF 2D ECHO EST: 70 %
LYMPHOCYTES # BLD AUTO: 0.42 10*3/MM3 (ref 0.6–4.8)
LYMPHOCYTES NFR BLD AUTO: 11.6 % (ref 24–44)
MCH RBC QN AUTO: 29.1 PG (ref 27–31)
MCHC RBC AUTO-ENTMCNC: 31.8 G/DL (ref 32–36)
MCV RBC AUTO: 91.7 FL (ref 80–99)
MONOCYTES # BLD AUTO: 0.05 10*3/MM3 (ref 0–1)
MONOCYTES NFR BLD AUTO: 1.4 % (ref 0–12)
NEUTROPHILS # BLD AUTO: 3.14 10*3/MM3 (ref 1.5–8.3)
NEUTROPHILS NFR BLD AUTO: 87 % (ref 41–71)
PLATELET # BLD AUTO: 205 10*3/MM3 (ref 150–450)
PMV BLD AUTO: 10.4 FL (ref 6–12)
POTASSIUM BLD-SCNC: 5.2 MMOL/L (ref 3.5–5.5)
PROTHROMBIN TIME: 11.1 SECONDS (ref 9.6–11.5)
RBC # BLD AUTO: 4.6 10*6/MM3 (ref 4.2–5.76)
SODIUM BLD-SCNC: 141 MMOL/L (ref 132–146)
SODIUM UR-SCNC: 84 MMOL/L (ref 30–90)
TRIGL SERPL-MCNC: 60 MG/DL (ref 0–150)
TSH SERPL DL<=0.05 MIU/L-ACNC: 2.18 MIU/ML (ref 0.35–5.35)
WBC NRBC COR # BLD: 3.61 10*3/MM3 (ref 3.5–10.8)

## 2017-02-02 PROCEDURE — 84300 ASSAY OF URINE SODIUM: CPT | Performed by: NURSE PRACTITIONER

## 2017-02-02 PROCEDURE — C1894 INTRO/SHEATH, NON-LASER: HCPCS | Performed by: INTERNAL MEDICINE

## 2017-02-02 PROCEDURE — 25010000002 MIDAZOLAM PER 1 MG: Performed by: INTERNAL MEDICINE

## 2017-02-02 PROCEDURE — 94640 AIRWAY INHALATION TREATMENT: CPT

## 2017-02-02 PROCEDURE — C1874 STENT, COATED/COV W/DEL SYS: HCPCS | Performed by: INTERNAL MEDICINE

## 2017-02-02 PROCEDURE — 4A023N7 MEASUREMENT OF CARDIAC SAMPLING AND PRESSURE, LEFT HEART, PERCUTANEOUS APPROACH: ICD-10-PCS | Performed by: INTERNAL MEDICINE

## 2017-02-02 PROCEDURE — B2151ZZ FLUOROSCOPY OF LEFT HEART USING LOW OSMOLAR CONTRAST: ICD-10-PCS | Performed by: INTERNAL MEDICINE

## 2017-02-02 PROCEDURE — 93571 IV DOP VEL&/PRESS C FLO 1ST: CPT | Performed by: INTERNAL MEDICINE

## 2017-02-02 PROCEDURE — 84443 ASSAY THYROID STIM HORMONE: CPT | Performed by: NURSE PRACTITIONER

## 2017-02-02 PROCEDURE — 0 IOPAMIDOL PER 1 ML: Performed by: INTERNAL MEDICINE

## 2017-02-02 PROCEDURE — 85347 COAGULATION TIME ACTIVATED: CPT

## 2017-02-02 PROCEDURE — C1769 GUIDE WIRE: HCPCS | Performed by: INTERNAL MEDICINE

## 2017-02-02 PROCEDURE — 25010000002 ADENOSINE (DIAGNOSTIC) PER 30 MG: Performed by: INTERNAL MEDICINE

## 2017-02-02 PROCEDURE — C1725 CATH, TRANSLUMIN NON-LASER: HCPCS | Performed by: INTERNAL MEDICINE

## 2017-02-02 PROCEDURE — 80061 LIPID PANEL: CPT | Performed by: NURSE PRACTITIONER

## 2017-02-02 PROCEDURE — 93306 TTE W/DOPPLER COMPLETE: CPT | Performed by: INTERNAL MEDICINE

## 2017-02-02 PROCEDURE — 93005 ELECTROCARDIOGRAM TRACING: CPT | Performed by: NURSE PRACTITIONER

## 2017-02-02 PROCEDURE — B2111ZZ FLUOROSCOPY OF MULTIPLE CORONARY ARTERIES USING LOW OSMOLAR CONTRAST: ICD-10-PCS | Performed by: INTERNAL MEDICINE

## 2017-02-02 PROCEDURE — 92978 ENDOLUMINL IVUS OCT C 1ST: CPT | Performed by: INTERNAL MEDICINE

## 2017-02-02 PROCEDURE — 93306 TTE W/DOPPLER COMPLETE: CPT

## 2017-02-02 PROCEDURE — 83036 HEMOGLOBIN GLYCOSYLATED A1C: CPT | Performed by: NURSE PRACTITIONER

## 2017-02-02 PROCEDURE — 94799 UNLISTED PULMONARY SVC/PX: CPT

## 2017-02-02 PROCEDURE — 85025 COMPLETE CBC W/AUTO DIFF WBC: CPT | Performed by: NURSE PRACTITIONER

## 2017-02-02 PROCEDURE — 84166 PROTEIN E-PHORESIS/URINE/CSF: CPT | Performed by: NURSE PRACTITIONER

## 2017-02-02 PROCEDURE — 25010000002 HEPARIN (PORCINE) PER 1000 UNITS: Performed by: INTERNAL MEDICINE

## 2017-02-02 PROCEDURE — 93458 L HRT ARTERY/VENTRICLE ANGIO: CPT | Performed by: INTERNAL MEDICINE

## 2017-02-02 PROCEDURE — 25010000002 HEPARIN (PORCINE) PER 1000 UNITS: Performed by: NURSE PRACTITIONER

## 2017-02-02 PROCEDURE — 25010000002 FENTANYL CITRATE (PF) 100 MCG/2ML SOLUTION: Performed by: INTERNAL MEDICINE

## 2017-02-02 PROCEDURE — 99233 SBSQ HOSP IP/OBS HIGH 50: CPT | Performed by: INTERNAL MEDICINE

## 2017-02-02 PROCEDURE — 25010000002 METHYLPREDNISOLONE PER 125 MG: Performed by: NURSE PRACTITIONER

## 2017-02-02 PROCEDURE — 027034Z DILATION OF CORONARY ARTERY, ONE ARTERY WITH DRUG-ELUTING INTRALUMINAL DEVICE, PERCUTANEOUS APPROACH: ICD-10-PCS | Performed by: INTERNAL MEDICINE

## 2017-02-02 PROCEDURE — C9600 PERC DRUG-EL COR STENT SING: HCPCS | Performed by: INTERNAL MEDICINE

## 2017-02-02 PROCEDURE — 82570 ASSAY OF URINE CREATININE: CPT | Performed by: NURSE PRACTITIONER

## 2017-02-02 PROCEDURE — 92928 PRQ TCAT PLMT NTRAC ST 1 LES: CPT | Performed by: INTERNAL MEDICINE

## 2017-02-02 PROCEDURE — 99232 SBSQ HOSP IP/OBS MODERATE 35: CPT | Performed by: INTERNAL MEDICINE

## 2017-02-02 PROCEDURE — 4A033BC MEASUREMENT OF ARTERIAL PRESSURE, CORONARY, PERCUTANEOUS APPROACH: ICD-10-PCS | Performed by: INTERNAL MEDICINE

## 2017-02-02 PROCEDURE — B221Z2Z COMPUTERIZED TOMOGRAPHY (CT SCAN) OF MULTIPLE CORONARY ARTERIES USING INTRAVASCULAR OPTICAL COHERENCE: ICD-10-PCS | Performed by: INTERNAL MEDICINE

## 2017-02-02 PROCEDURE — 80048 BASIC METABOLIC PNL TOTAL CA: CPT | Performed by: NURSE PRACTITIONER

## 2017-02-02 PROCEDURE — 85610 PROTHROMBIN TIME: CPT | Performed by: NURSE PRACTITIONER

## 2017-02-02 PROCEDURE — C1753 CATH, INTRAVAS ULTRASOUND: HCPCS | Performed by: INTERNAL MEDICINE

## 2017-02-02 PROCEDURE — C1887 CATHETER, GUIDING: HCPCS | Performed by: INTERNAL MEDICINE

## 2017-02-02 PROCEDURE — 84156 ASSAY OF PROTEIN URINE: CPT | Performed by: NURSE PRACTITIONER

## 2017-02-02 DEVICE — XIENCE ALPINE EVEROLIMUS ELUTING CORONARY STENT SYSTEM 2.75 MM X 38 MM / RAPID-EXCHANGE
Type: IMPLANTABLE DEVICE | Status: FUNCTIONAL
Brand: XIENCE ALPINE

## 2017-02-02 RX ORDER — LIDOCAINE HYDROCHLORIDE 10 MG/ML
INJECTION, SOLUTION INFILTRATION; PERINEURAL AS NEEDED
Status: DISCONTINUED | OUTPATIENT
Start: 2017-02-02 | End: 2017-02-02 | Stop reason: HOSPADM

## 2017-02-02 RX ORDER — CLOPIDOGREL BISULFATE 75 MG/1
325 TABLET ORAL ONCE
Status: DISCONTINUED | OUTPATIENT
Start: 2017-02-02 | End: 2017-02-02

## 2017-02-02 RX ORDER — HYDROCODONE BITARTRATE AND ACETAMINOPHEN 7.5; 325 MG/1; MG/1
1 TABLET ORAL EVERY 4 HOURS PRN
Status: DISCONTINUED | OUTPATIENT
Start: 2017-02-02 | End: 2017-02-04 | Stop reason: HOSPADM

## 2017-02-02 RX ORDER — ASPIRIN 325 MG
325 TABLET ORAL ONCE
Status: COMPLETED | OUTPATIENT
Start: 2017-02-02 | End: 2017-02-02

## 2017-02-02 RX ORDER — PREDNISONE 20 MG/1
40 TABLET ORAL
Status: DISCONTINUED | OUTPATIENT
Start: 2017-02-03 | End: 2017-02-04 | Stop reason: HOSPADM

## 2017-02-02 RX ORDER — SODIUM CHLORIDE 9 MG/ML
3 INJECTION, SOLUTION INTRAVENOUS CONTINUOUS
Status: ACTIVE | OUTPATIENT
Start: 2017-02-02 | End: 2017-02-03

## 2017-02-02 RX ORDER — CLOPIDOGREL BISULFATE 75 MG/1
75 TABLET ORAL DAILY
Status: DISCONTINUED | OUTPATIENT
Start: 2017-02-03 | End: 2017-02-04 | Stop reason: HOSPADM

## 2017-02-02 RX ORDER — PRASUGREL 5 MG/1
TABLET, FILM COATED ORAL AS NEEDED
Status: DISCONTINUED | OUTPATIENT
Start: 2017-02-02 | End: 2017-02-02 | Stop reason: HOSPADM

## 2017-02-02 RX ORDER — HYDROMORPHONE HYDROCHLORIDE 1 MG/ML
0.5 INJECTION, SOLUTION INTRAMUSCULAR; INTRAVENOUS; SUBCUTANEOUS
Status: DISCONTINUED | OUTPATIENT
Start: 2017-02-02 | End: 2017-02-04 | Stop reason: HOSPADM

## 2017-02-02 RX ORDER — FENTANYL CITRATE 50 UG/ML
INJECTION, SOLUTION INTRAMUSCULAR; INTRAVENOUS AS NEEDED
Status: DISCONTINUED | OUTPATIENT
Start: 2017-02-02 | End: 2017-02-02 | Stop reason: HOSPADM

## 2017-02-02 RX ORDER — PHENYLEPHRINE HCL IN 0.9% NACL 0.5 MG/5ML
SYRINGE (ML) INTRAVENOUS AS NEEDED
Status: DISCONTINUED | OUTPATIENT
Start: 2017-02-02 | End: 2017-02-02 | Stop reason: HOSPADM

## 2017-02-02 RX ORDER — MIDAZOLAM HYDROCHLORIDE 1 MG/ML
INJECTION INTRAMUSCULAR; INTRAVENOUS AS NEEDED
Status: DISCONTINUED | OUTPATIENT
Start: 2017-02-02 | End: 2017-02-02 | Stop reason: HOSPADM

## 2017-02-02 RX ORDER — HEPARIN SODIUM 1000 [USP'U]/ML
INJECTION, SOLUTION INTRAVENOUS; SUBCUTANEOUS AS NEEDED
Status: DISCONTINUED | OUTPATIENT
Start: 2017-02-02 | End: 2017-02-02 | Stop reason: HOSPADM

## 2017-02-02 RX ORDER — CLOPIDOGREL BISULFATE 75 MG/1
300 TABLET ORAL ONCE
Status: COMPLETED | OUTPATIENT
Start: 2017-02-02 | End: 2017-02-02

## 2017-02-02 RX ORDER — SODIUM CHLORIDE 9 MG/ML
INJECTION, SOLUTION INTRAVENOUS CONTINUOUS PRN
Status: DISCONTINUED | OUTPATIENT
Start: 2017-02-02 | End: 2017-02-02 | Stop reason: HOSPADM

## 2017-02-02 RX ORDER — NALOXONE HCL 0.4 MG/ML
0.4 VIAL (ML) INJECTION
Status: DISCONTINUED | OUTPATIENT
Start: 2017-02-02 | End: 2017-02-04 | Stop reason: HOSPADM

## 2017-02-02 RX ADMIN — ATORVASTATIN CALCIUM 80 MG: 40 TABLET, FILM COATED ORAL at 08:18

## 2017-02-02 RX ADMIN — IPRATROPIUM BROMIDE AND ALBUTEROL SULFATE 3 ML: .5; 3 SOLUTION RESPIRATORY (INHALATION) at 12:59

## 2017-02-02 RX ADMIN — PANTOPRAZOLE SODIUM 40 MG: 40 TABLET, DELAYED RELEASE ORAL at 06:25

## 2017-02-02 RX ADMIN — ASPIRIN 325 MG ORAL TABLET 325 MG: 325 PILL ORAL at 14:51

## 2017-02-02 RX ADMIN — IPRATROPIUM BROMIDE AND ALBUTEROL SULFATE 3 ML: .5; 3 SOLUTION RESPIRATORY (INHALATION) at 07:18

## 2017-02-02 RX ADMIN — HEPARIN SODIUM 5000 UNITS: 5000 INJECTION, SOLUTION INTRAVENOUS; SUBCUTANEOUS at 06:25

## 2017-02-02 RX ADMIN — HYDROCODONE BITARTRATE AND ACETAMINOPHEN 1 TABLET: 7.5; 325 TABLET ORAL at 22:12

## 2017-02-02 RX ADMIN — METHYLPREDNISOLONE SODIUM SUCCINATE 40 MG: 125 INJECTION, POWDER, FOR SOLUTION INTRAMUSCULAR; INTRAVENOUS at 06:25

## 2017-02-02 RX ADMIN — SODIUM CHLORIDE 100 ML/HR: 9 INJECTION, SOLUTION INTRAVENOUS at 08:16

## 2017-02-02 RX ADMIN — SODIUM CHLORIDE 3 ML/KG/HR: 9 INJECTION, SOLUTION INTRAVENOUS at 20:15

## 2017-02-02 RX ADMIN — HEPARIN SODIUM 5000 UNITS: 5000 INJECTION, SOLUTION INTRAVENOUS; SUBCUTANEOUS at 14:18

## 2017-02-02 RX ADMIN — CLOPIDOGREL 300 MG: 75 TABLET, FILM COATED ORAL at 14:51

## 2017-02-02 RX ADMIN — IPRATROPIUM BROMIDE AND ALBUTEROL SULFATE 3 ML: .5; 3 SOLUTION RESPIRATORY (INHALATION) at 00:58

## 2017-02-02 RX ADMIN — ADENOSINE 63.9 MG: 3 INJECTION, SOLUTION INTRAVENOUS at 18:00

## 2017-02-02 RX ADMIN — ASPIRIN 81 MG: 81 TABLET, COATED ORAL at 08:18

## 2017-02-02 NOTE — PROGRESS NOTES
Discharge Planning Assessment  [facility]     Patient Name: Pavel Reyes  MRN: [id number]  Today's Date: 2/2/2017    Admit Date: 2/1/2017          Discharge Needs Assessment       02/02/17 0940    Living Environment    Lives With spouse    Living Arrangements house    Provides Primary Care For no one    Quality Of Family Relationships supportive    Able to Return to Prior Living Arrangements yes    Living Arrangement Comments Patient reports he is from [place] and is here completing a job. He indicated he may have to stay in area prior to going back to his home.     Discharge Needs Assessment    Concerns To Be Addressed no discharge needs identified   No discharge needs identified at this time.    Readmission Within The Last 30 Days no previous admission in last 30 days    Equipment Currently Used at Home none    Equipment Needed After Discharge none    Transportation Available car    Discharge Disposition home or self-care            Discharge Plan       02/02/17 0943    Case Management/Social Work Plan    Plan Home    Patient/Family In Agreement With Plan yes    Additional Comments Spoke with patient regarding discharge planning. 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 Patient indicates all his family is in [place] with the exception of a son in law who lives in [place]. Patient plans to return to home after dishcarge after a couple of day of rest here in [place].         Discharge Placement     No information found        Expected Discharge Date and Time     Expected Discharge Date Expected Discharge Time    Feb 3, 2017               Demographic Summary       02/02/17 0937    Referral Information    Admission Type inpatient    Arrived From admitted as an inpatient    Referral Source admission list    Reason For Consult discharge planning    Record Reviewed medical record    Primary Care Physician Information    Name Dr Moris Elizabeth, [place]             Functional Status       02/02/17 0939    Functional Status Prior    Ambulation 0-->independent    Transferring 0-->independent    Toileting 0-->independent    Bathing 0-->independent    Dressing 0-->independent    Eating 0-->independent    Communication 0-->understands/communicates without difficulty    Swallowing 0-->swallows foods/liquids without difficulty    Prior Functional Level Comment Patient stated he was independent prior to this admission. Patient continues to work.     IADL    Medications independent    Meal Preparation independent    Housekeeping independent    Laundry independent    Shopping independent    Oral Care independent    Activity Tolerance    Current Activity Limitations weakness severe, generalized    Usual Activity Tolerance good    Current Activity Tolerance moderate    Employment/Financial    Employment/Finance Comments Patient verified he has Medicare Part A for insurance coverage and denies any concerns with financial obligations.             Psychosocial     None            Abuse/Neglect     None            Legal     None            Substance Abuse     None            Patient Forms     None          Tom Briggs RN

## 2017-02-02 NOTE — CONSULTS
Inpatient Consult to Cardiology  Consult performed by: SETH NORMAN  Consult ordered by: ROSS CHONG  Reason for consult: Unstable angina          [facility]  Cardiovascular Consultation Note             The patient is an 81-year-old gentleman with no known cardiac issues who is visiting [place] for work and resides in [place] typically.  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  He states that over the last 3 weeks while doing this labor he is been feeling weaker and weaker.  He is noted himself to have chest discomfort with this work which is new.  He has had a more gradual decline in his functional capacity which she estimates is been over the last year.  He is also particular concern by his reduced appetite over the last several weeks.  He is been having to supplement with a liquid breakfast formulation just to get his calories as he's been not hungry.  He states his breathing has been also progressively worsened and has been notably worsened over the last several weeks.  He was admitted to [facility] after experiencing a near syncopal episode and presenting to the emergency room.  He ruled out for myocardial infarction with enzymes and EKG.        Cardiac risk factors: Advanced age, former smoker, hypertension    Past medical and surgical history, social and family history reviewed in EMR.    REVIEW OF SYSTEMS:   H&P ROS reviewed and pertinent CV ROS as noted in HPI.         Vital Sign Min/Max for last 24 hours  Temp  Min: 97.5 °F (36.4 °C)  Max: 98 °F (36.7 °C)   BP  Min: 110/52  Max: 150/73   Pulse  Min: 55  Max: 73   Resp  Min: 16  Max: 20   SpO2  Min: 86 %  Max: 98 %   Flow (L/min)  Min: 2  Max: 2      Intake/Output Summary (Last 24 hours) at 02/02/17 2003  Last data filed at 02/02/17 1500   Gross per 24 hour   Intake   1670 ml   Output    900 ml   Net    770 ml         Physical Exam   Constitutional: He is oriented to  person, place, and time. He appears well-developed and well-nourished.   HENT:   Head: Normocephalic and atraumatic.   Eyes: Pupils are equal, round, and reactive to light. No scleral icterus.   Neck: No JVD present. No thyromegaly present.   Cardiovascular: Normal rate and regular rhythm.  Exam reveals no gallop.    No murmur heard.  Pulmonary/Chest: Effort normal and breath sounds normal.   Abdominal: Soft. He exhibits no distension.   Musculoskeletal: He exhibits no edema.   Neurological: He is alert and oriented to person, place, and time.   Skin: Skin is warm and dry.   Psychiatric: He has a normal mood and affect. His behavior is normal.       EKG: Normal Sinus Rhythm    Lab Review:   Labs reviewed in the electronic medical record.  Pertinent findings include:  Lab Results   Component Value Date    GLUCOSE 150 (H) 02/02/2017    BUN 25 (H) 02/02/2017    CREATININE 1.00 02/02/2017    EGFRIFNONA 72 02/02/2017    BCR 25.0 02/02/2017    CO2 27.0 02/02/2017    CALCIUM 8.9 02/02/2017    ALBUMIN 4.20 02/01/2017    LABIL2 1.6 02/01/2017    AST 30 02/01/2017    ALT 21 02/01/2017     Lab Results   Component Value Date    WBC 3.61 02/02/2017    HGB 13.4 02/02/2017    HCT 42.2 02/02/2017    MCV 91.7 02/02/2017     02/02/2017         Results from last 7 days  Lab Units 02/02/17  0740   HEMOGLOBIN A1C % 6.60*              Hospital Problem List     Coronary 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  Given his age and ANJALI risk score, recommend proceeding with invasive cardiac catheterization.       · Cardiac catheterization with possible PCI via the left radial  approach  · Give Plavix 300 mg po x1  and 325 mg aspirin po x 1 now  · No beta blocker due to baseline bradycardia        Osman Mujica MD  2/2/2017

## 2017-02-02 NOTE — PLAN OF CARE
Problem: Patient Care Overview (Adult)  Goal: Plan of Care Review  Outcome: Ongoing (interventions implemented as appropriate)    02/02/17 0421   Coping/Psychosocial Response Interventions   Plan Of Care Reviewed With patient   Patient Care Overview   Progress no change   Outcome Evaluation   Outcome Summary/Follow up Plan Patient slept well tonight and has no complaints of CP tonight. Patient wearing 2L NC tonight and he states he does not wear oxygen at home. Patient to have 2D echo in the morning. Continue with close vital sign monitoring.          Problem: COPD, Chronic Bronchitis/Emphysema (Adult)  Goal: Signs and Symptoms of Listed Potential Problems Will be Absent or Manageable (COPD, Chronic Bronchitis/Emphysema)  Outcome: Ongoing (interventions implemented as appropriate)

## 2017-02-02 NOTE — PROGRESS NOTES
"      HOSPITALIST DAILY PROGRESS NOTE    Chief Complaint: SOA, syncope    Subjective   SUBJECTIVE/OVERNIGHT EVENTS   No events overnight, still c/o some dyspnea but appears comfortable during my exam and while we are talking.     Review of Systems:  Gen-no fevers, no chills  CV-no chest pain, no palpitations  Resp-as above  GI-no N/V/D, no abd pain    Objective   OBJECTIVE   I have reviewed the vital signs.  Visit Vitals   • /64 (BP Location: Left arm, Patient Position: Lying)   • Pulse 59   • Temp 97.5 °F (36.4 °C) (Oral)   • Resp 16   • Ht 70\" (177.8 cm)   • Wt 143 lb (64.9 kg)   • SpO2 96%   • BMI 20.52 kg/m2       Physical Exam:  Gen-no acute distress  CV-RRR, S1 S2 normal, no m/r/g  Resp-wheezing diffusely bilaterally  Abd-soft, NT, ND, +BS  Ext-no edema  Neuro-A&Ox3, no focal deficits  Psych-appropriate mood    Results:  I have reviewed the labs, culture data, radiology results, and diagnostic studies.      Results from last 7 days  Lab Units 02/02/17  0740 02/01/17  1408   WBC 10*3/mm3 3.61 5.21   HEMOGLOBIN g/dL 13.4 13.6   HEMATOCRIT % 42.2 42.6   PLATELETS 10*3/mm3 205 210       Results from last 7 days  Lab Units 02/02/17  0740   SODIUM mmol/L 141   POTASSIUM mmol/L 5.2   CHLORIDE mmol/L 108   TOTAL CO2 mmol/L 27.0   BUN mg/dL 25*   CREATININE mg/dL 1.00   GLUCOSE mg/dL 150*   CALCIUM mg/dL 8.9       Culture Data:  Cultures:           Radiology Results:  Imaging Results (last 24 hours)     Procedure Component Value Units Date/Time    XR Chest 1 View [[id number]] Collected:  02/01/17 1536     Updated:  02/01/17 1626    Narrative:       EXAMINATION: XR CHEST 1 VW- 02/01/2017     INDICATION: Chest Pain triage protocol      COMPARISON: NONE     FINDINGS: The cardiac silhouette is normal. There is no pulmonary  inflammatory process, mass or effusion.           Impression:       Chronic change; no active disease.     D:  02/01/2017  E:  02/01/2017        This report was finalized on 2/1/2017 4:24 PM by " Aniket Singh MD.       CT Angiogram Chest With Contrast [[id number]] Collected:  02/02/17 1130     Updated:  02/02/17 1130    Narrative:       EXAMINATION: CT ANGIOGRAM CHEST W CONTRAST-      INDICATION: PE.     TECHNIQUE: CT angiogram of the chest was performed following bolus  infusion of contrast. Images are displayed in the axial and coronal  projections (2-D).     The radiation dose reduction device was turned on for each scan per the  ALARA (As Low as Reasonably Achievable) protocol.     COMPARISON: None.     FINDINGS: There is no axillary lymphadenopathy. There is no mediastinal  or hilar adenopathy. There is no pulmonary embolus. There is no  pericardial effusion. The thoracic aorta demonstrates areas of  calcification but, otherwise is unremarkable. There is no pleural  effusion. Images displayed at lung window settings reveal no acute  inflammatory process, mass or nodule.       Impression:       Negative CT angiogram of the chest.     D:  02/02/2017  E:  02/02/2017                I have reviewed the medications.      Assessment/Plan   ASSESSMENT/PLAN    Principal Problem:    Syncope  Active Problems:    COPD exacerbation    Hypoxia    GERD (gastroesophageal reflux disease)    History of tobacco abuse    Hypertension    Hyperlipidemia    TAMAR (acute kidney injury)   82 yo  resident of [place] who presents after an acute syncopal event with hypoxia and c/o weeks of malaise and cough.     # Acute hypoxic respiratory failure- suspect 2/2 COPD exacerbation vs acute decompensated CHF  # COPD exacerbation, mild  # Syncopal event  # Unstable angina  # TAMAR, likely pre-renal- resolved      Plan  - continue steroids, continue Duonebs  - CTA NEGATIVE for PE  - Troponin (-)  - TTE PENDING  - cardiology consulted    More than 50% of time spent counseling on current illness and plan of care. Case discussed with: the patient  Total time of the encounter was 35 minutes.    I expect patient to be discharged in: 1-2  days to home    Mar Short MD  02/02/17  12:26 PM

## 2017-02-02 NOTE — PROGRESS NOTES
"Adult Nutrition  Assessment/PES    Patient Name:  Pavel Reyes  YOB: 1935  MRN: [id number]  Admit Date:  2/1/2017    Assessment Date:  2/2/2017        Reason for Assessment       02/02/17 1511    Reason for Assessment    Reason For Assessment/Visit physician consult;identified at risk by screening criteria    Identified At Risk By Screening Criteria MST SCORE 2+    Time Spent (min) 45    Diagnosis Diagnosis    Cardiac Other (comment);HTN;Dyslipidemia   [place]    Gastrointestinal GERD/Reflux    Pulmonary/Critical Care COPD;Hypoxemia;Other (comment)   CHRONIC BRONCHITIS    Renal TAMAR    Substance Use Other (comment)   FORMER TOBACCO    Other diagnosis FAINTING              Nutrition/Diet History       02/02/17 1513    Nutrition/Diet History    Food Allergies/Intolerances --   PT REPORTS HIS APPETITE & INTAKE OF FOOD HAS BEEN DOWN SIGNIFICANTLY FOR ABOUT 4-5 DAYS BUT HAS BEEN GENERALLY DOWN FOR ABOUT A YEAR. HE REPORTS HIS UBW ABOUT A YEAR AGO #. HE RELAYS THE WEIGHT LOSS TO A CHANGE IN APPETITE & INTAKE OF FOOD. HE      Factors Affecting Nutritional Intake Factors   DENIES FOOD DISLIKES OR INTOLERANCES.    Reported/Observed By Patient            Anthropometrics       02/02/17 1514    Anthropometrics    Height 177.8 cm (70\")    RD Documented Weight on Admission 65.1 kg (143 lb 9.6 oz)    Ideal Body Weight (IBW)    Ideal Body Weight (IBW), Male (kg) 76.48    Usual Body Weight (UBW)    Usual Body Weight 77.1 kg (170 lb)    % Usual Body Weight Malnutrition 80-90% - mild deficit    Weight Loss 12 kg (26 lb 6.4 oz)    % Weight Loss  15.5 %    Weight Loss Time Frame 1 YEAR            Labs/Tests/Procedures/Meds       02/02/17 1515    Labs/Tests/Procedures/Meds    Labs/Tests Review Reviewed    Medication Review Reviewed, pertinent;Steroid            Physical Findings       02/02/17 1516    Physical Findings/Assessment    Additional Documentation Physical Appearance (Group)    Physical Appearance    " Gastrointestinal other (see comments)   WDL PER DOCUMENTATION    Skin other (see comments)   WDL PER DOCUMENTATION              Nutrition Prescription Ordered       02/02/17 1516    Nutrition Prescription PO    Current PO Diet NPO                Malnutrition Severity Assessment       02/02/17 1508    Weight Status (Chronic)    %IBW Mild (<90%)    %UBW Mod (75-85%)    Weight Loss Mild (>10% / 1 yr)    Energy Intake Status (Chronic)    Energy Intake Severe (< or equal to 50% / > or equal to 1 mo)   < OR = 75% FOR > OR = 1 MONTH    Criteria Met (Must meet criteria for severity in at least 2 of these categories: M Wasting, Fat Loss, Fluid, Secondary Signs, Wt. Status, Intake)    Patient meets criteria for  Moderate malnutrition      02/02/17 1506    Malnutrition Severity Assessment    Malnutrition Type Chronic Illness Malnutrition          Problem/Interventions:        Problem 1       02/02/17 1516    Nutrition Diagnoses Problem 1    Problem 1 Malnutrition   CHRONIC, MODERATE    Etiology (related to) Medical Diagnosis    Cardiac Other (comment)   [place]    Pulmonary/Critical Care COPD;Other (comment)   CHRONIC BRONCHITIS    Signs/Symptoms (evidenced by) Report of Mnimal PO Intake;% IBW;% UBW;Unintended Weight Change    Percent (%) IBW 87 %    Percent (%) UBW 84 %    Unintended Weight Change Loss    Number of Pounds Lost 26.4    Weight loss time period 1 YEAR                    Intervention Goal       02/02/17 1518    Intervention Goal    General Nutrition support treatment    PO Establish PO   ONCE PO DIET RESUMES            Nutrition Intervention       02/02/17 1518    Nutrition Intervention    RD/Tech Action Interview for preference;Await begin PO              Education/Evaluation       02/02/17 1518    Monitor/Evaluation    Monitor Per protocol;PO intake;Pertinent labs;Weight;Skin status;Symptoms        Comments:      Electronically signed by:  Jazlyn Willis RD  02/02/17 3:19 PM

## 2017-02-02 NOTE — H&P
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  Over the past 5-7 days he has noticed while working and lifting, increased shortness of breath along with dull pressure to his mid chest.  He states the chest pressure does not last very long but has been intermittent.  Today while working he states he became again more short of breath and started having dizziness and \"passed out.\"  He recalls the event and states he doesn't think he was \"out\" very long and once he came to he immediately got in his truck and drove to the ED.  He denies any fever, chills, cough, nausea, vomiting, or diarrhea.  Over the last several days he states his appetite has poor and he has not been drinking very much.  He currently is chest pain free and states his dyspnea is better after the neb treatment and steroids.  He will be admitted to [facility] under the care of the Hospitalist for further evaluation and treatment.         Review of Systems   Constitutional: Positive for appetite change. Negative for chills, diaphoresis, fatigue and fever.   HENT: Negative for congestion, postnasal drip, rhinorrhea, sneezing and sore throat.    Eyes: Negative.    Respiratory: Positive for shortness of breath. Negative for cough and wheezing.    Cardiovascular: Positive for chest pain. Negative for palpitations and leg swelling.   Gastrointestinal: Negative for abdominal distention, abdominal pain, blood in stool, constipation, diarrhea, nausea and vomiting.   Endocrine: Negative.    Genitourinary: Negative for difficulty urinating, dysuria, flank pain, hematuria and urgency.   Musculoskeletal: Negative.    Skin: Negative.    Allergic/Immunologic: " "Negative.    Neurological: Positive for dizziness, syncope and light-headedness. Negative for tremors, seizures, facial asymmetry, speech difficulty, weakness, numbness and headaches.   Hematological: Negative.    Psychiatric/Behavioral: Negative.       Otherwise complete ROS reviewed and negative except as mentioned in the HPI.      Past Medical History:   Past Medical History   Diagnosis Date   • Arthritis    • COPD (chronic obstructive pulmonary disease)    • GERD (gastroesophageal reflux disease)    • Hypertension        Past Surgical History:  Past Surgical History   Procedure Laterality Date   • Abdominal surgery         Family History:   Family History   Problem Relation Age of Onset   • Heart disease Father      Social History:   Social History     Social History   • Marital status:      Spouse name: N/A   • Number of children: N/A   • Years of education: N/A     Occupational History   • Not on file.     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.         Medications:    (Not in a hospital admission)  Allergies:  No Known Allergies    Objective     Vital Signs:   Visit Vitals   • /73   • Pulse 59   • Temp 97.6 °F (36.4 °C) (Oral)   • Resp 16   • Ht 70\" (177.8 cm)   • Wt 140 lb (63.5 kg)   • SpO2 96%   • BMI 20.09 kg/m2     Physical Exam   Constitutional: He appears well-developed and well-nourished. No distress.   Alert and oriented x3 pleasant cooperative.  Appears comfortable and in no acute distress. Speaks clear full sentences.  No family is at bedside.    HENT:   Head: Normocephalic.   Eyes: Conjunctivae and EOM are normal. Pupils are equal, round, and reactive to light. Right eye " exhibits no discharge. Left eye exhibits no discharge. No scleral icterus.   Neck: Normal range of motion. Neck supple. No JVD present. No tracheal deviation present. No thyromegaly present.   Cardiovascular: Normal rate, regular rhythm, normal heart sounds and intact distal pulses.  Exam reveals no gallop and no friction rub.    No murmur heard.  Pulmonary/Chest: Effort normal and breath sounds normal. No stridor. No respiratory distress. He has no wheezes. He has no rales. He exhibits no tenderness.   Abdominal: Soft. Bowel sounds are normal. He exhibits no distension and no mass. There is no tenderness. There is no rebound and no guarding. No hernia.   Musculoskeletal: Normal range of motion. He exhibits no tenderness or deformity.   Lymphadenopathy:     He has no cervical adenopathy.   Neurological: He is alert. He has normal reflexes.   Skin: Skin is warm and dry. No rash noted. He is not diaphoretic. No erythema. No pallor.   Psychiatric: He has a normal mood and affect. His behavior is normal. Judgment and thought content normal.   Vitals reviewed.      Results Reviewed:    Results from last 7 days  Lab Units 02/01/17  1408   WBC 10*3/mm3 5.21   HEMOGLOBIN g/dL 13.6   PLATELETS 10*3/mm3 210       Results from last 7 days  Lab Units 02/01/17  1408   SODIUM mmol/L 138   POTASSIUM mmol/L 5.1   TOTAL CO2 mmol/L 29.0   CREATININE mg/dL 1.50*   GLUCOSE mg/dL 96   CALCIUM mg/dL 9.8     Imaging Results (last 24 hours)     Procedure Component Value Units Date/Time    CT Angiogram Chest With Contrast [[id number]]      Updated:  02/01/17 1625    XR Chest 1 View [[id number]] Collected:  02/01/17 1536     Updated:  02/01/17 1626    Narrative:       EXAMINATION: XR CHEST 1 VW- 02/01/2017     INDICATION: Chest Pain triage protocol      COMPARISON: NONE     FINDINGS: The cardiac silhouette is normal. There is no pulmonary  inflammatory process, mass or effusion.           Impression:       Chronic change; no active disease.      D:  02/01/2017  E:  02/01/2017        This report was finalized on 2/1/2017 4:24 PM by Dr. Aniket Singh MD.               I have personally reviewed and interpreted the radiology studies and ECG obtained at time of admission.     Assessment / Plan      Assessment & Plan  Principal Problem:    Syncope  Active Problems:    COPD exacerbation    Hypoxia    TAMAR (acute kidney injury)    GERD (gastroesophageal reflux disease)    History of tobacco abuse    Hypertension    Hyperlipidemia    1) Syncope  -etiology unclear  -will continue IVF, troponin X2 <0.006 and 0.001  -EKG unremarkable  -CTA negative for PE  -fall precautions  -will get Echo in am  -check TSH, FLP, Hgb A1c  -consult cardiology to see in am  -check orthostatics bid    2) Hypoxia  -upon arrival to ED o2 sat was 86 %  -will continue IV steroids  -continuous pulse ox  -cxr showed no acute changes, mass or effusion  -CTA negative for PE  -BNP 73    3) TAMAR  -unknown baseline  -likely secondary to volume depletion  -continue IVF  -check urine sodium, creatinine and protein  -hold lisinopril    4) Unstable angina  -two sets troponin negative  -will continue to trend  -consult cardiology in am  -ASA given in ED  -ANJALI: 3  -NPO after midnight   -prn sl nitro for chest pain  -repeat EKG in am    5) Essential HTN  -hold lisinopril for now due to #3    6) Hyperlipidemia  -increase statin from 40 to 80mg    7) DVT prophylaxis  -teds/scds, heparin    8) Code status:   -full code        I discussed the patients findings and my recommendations with:patient and primary care team.     Shanna Chiu, KALEE 02/01/17 7:37 PM      Brief Attending Note       I have seen and examined the patient, performing an independent face-to-face diagnostic evaluation with plan of care reviewed and developed with the advanced practice clinician (APC).      Brief Summary Statement/HPI:   81 yoM with hx of COPD p/w 5-7 days of increased dyspnea on exertion and chest discomfort.He mentions  that this afternoon he passed out, thinks he was down for a short while, and after waking up he drove himself to the ED. Chest pain is substernal, non-radiating, denies any fevers or chills, no n/v/d,     Attending Physical Exam:  Temp:  [97.6 °F (36.4 °C)] 97.6 °F (36.4 °C)  Heart Rate:  [] 59  Resp:  [16-22] 16  BP: ()/(51-73) 118/73     Constitutional: no acute distress, awake, alert  Eyes: PERRLA, sclerae anicteric, no conjunctival injection  Neck: supple, no thyromegaly, trachea midline  Respiratory: Clear to auscultation bilaterally, nonlabored respirations   Cardiovascular: RRR, no murmurs, rubs, or gallops, palpable pedal pulses bilaterally  Gastrointestinal: Positive bowel sounds, soft, nontender, nondistended  Musculoskeletal: No bilateral ankle edema, no clubbing or cyanosis to bilateral lower extremities  Psychiatric: oriented x 3, appropriate affect, cooperative  Neurologic: Strength symmetric in all extremities, Cranial Nerves grossly intact to confrontation       Brief Assessment/Plan :  # Acute hypoxic respiratory failure, etiology unknown, suspected COPD exacerbation vs HF  # COPD exacerbation, mild  # suspected syncope  # Unstable angina  # TAMAR, likely pre-renal    Plan  - s/p steroids, continue Duonebs  - CT: PE negative for PE  - Trend tropinin, repeat EKG in a.m  - Echo  - cardiology consult    INPATIENT status due to the need for care which can only be reasonably provided in an hospital setting such as aggressive/expedited ancillary services and/or consultation services and the necessity for IV medications, close physician monitoring and/or the possible need for procedures.  In such, I feel patient’s risk for adverse outcomes and need for care warrant INPATIENT evaluation and predict the patient’s care encounter to likely last beyond 2 midnights.    Ivy Murphy MD  02/01/17  8:18 PM

## 2017-02-03 LAB
ALBUMIN 24H MFR UR ELPH: 16.8 %
ALPHA1 GLOB 24H MFR UR ELPH: 6.3 %
ALPHA2 GLOB 24H MFR UR ELPH: 25.9 %
ANION GAP SERPL CALCULATED.3IONS-SCNC: 3 MMOL/L (ref 3–11)
B-GLOBULIN MFR UR ELPH: 27.5 %
BUN BLD-MCNC: 17 MG/DL (ref 9–23)
BUN/CREAT SERPL: 18.9 (ref 7–25)
CALCIUM SPEC-SCNC: 8.5 MG/DL (ref 8.7–10.4)
CHLORIDE SERPL-SCNC: 110 MMOL/L (ref 99–109)
CO2 SERPL-SCNC: 26 MMOL/L (ref 20–31)
CREAT BLD-MCNC: 0.9 MG/DL (ref 0.6–1.3)
DEPRECATED RDW RBC AUTO: 45.6 FL (ref 37–54)
ERYTHROCYTE [DISTWIDTH] IN BLOOD BY AUTOMATED COUNT: 13.5 % (ref 11.3–14.5)
GAMMA GLOB 24H MFR UR ELPH: 23.5 %
GFR SERPL CREATININE-BSD FRML MDRD: 81 ML/MIN/1.73
GLUCOSE BLD-MCNC: 80 MG/DL (ref 70–100)
HCT VFR BLD AUTO: 37.4 % (ref 38.9–50.9)
HGB BLD-MCNC: 11.6 G/DL (ref 13.1–17.5)
Lab: NORMAL
M PROTEIN 24H UR ELPH-MRATE: NORMAL MG/24 HR
M PROTEIN MFR UR ELPH: NORMAL %
MCH RBC QN AUTO: 28.5 PG (ref 27–31)
MCHC RBC AUTO-ENTMCNC: 31 G/DL (ref 32–36)
MCV RBC AUTO: 91.9 FL (ref 80–99)
PLATELET # BLD AUTO: 180 10*3/MM3 (ref 150–450)
PMV BLD AUTO: 10.3 FL (ref 6–12)
POTASSIUM BLD-SCNC: 4.4 MMOL/L (ref 3.5–5.5)
PROT 24H UR-MRATE: NORMAL MG/24 HR (ref 30–150)
PROT UR-MCNC: 6.2 MG/DL
RBC # BLD AUTO: 4.07 10*6/MM3 (ref 4.2–5.76)
SODIUM BLD-SCNC: 139 MMOL/L (ref 132–146)
WBC NRBC COR # BLD: 8.8 10*3/MM3 (ref 3.5–10.8)

## 2017-02-03 PROCEDURE — 80048 BASIC METABOLIC PNL TOTAL CA: CPT | Performed by: INTERNAL MEDICINE

## 2017-02-03 PROCEDURE — 63710000001 PREDNISONE PER 5 MG: Performed by: INTERNAL MEDICINE

## 2017-02-03 PROCEDURE — 93005 ELECTROCARDIOGRAM TRACING: CPT | Performed by: INTERNAL MEDICINE

## 2017-02-03 PROCEDURE — 99231 SBSQ HOSP IP/OBS SF/LOW 25: CPT | Performed by: NURSE PRACTITIONER

## 2017-02-03 PROCEDURE — 93010 ELECTROCARDIOGRAM REPORT: CPT | Performed by: INTERNAL MEDICINE

## 2017-02-03 PROCEDURE — 99232 SBSQ HOSP IP/OBS MODERATE 35: CPT | Performed by: INTERNAL MEDICINE

## 2017-02-03 PROCEDURE — 85027 COMPLETE CBC AUTOMATED: CPT | Performed by: INTERNAL MEDICINE

## 2017-02-03 RX ORDER — ASPIRIN 81 MG/1
81 TABLET ORAL DAILY
COMMUNITY
End: 2017-02-04

## 2017-02-03 RX ORDER — ASPIRIN 81 MG/1
81 TABLET ORAL DAILY
Qty: 30 TABLET | Refills: 11 | Status: SHIPPED | OUTPATIENT
Start: 2017-02-03 | End: 2018-01-29

## 2017-02-03 RX ORDER — ATORVASTATIN CALCIUM 40 MG/1
40 TABLET, FILM COATED ORAL DAILY
Qty: 30 TABLET | Refills: 0 | Status: CANCELLED | OUTPATIENT
Start: 2017-02-03

## 2017-02-03 RX ORDER — CLOPIDOGREL BISULFATE 75 MG/1
75 TABLET ORAL DAILY
Qty: 30 TABLET | Refills: 11 | Status: SHIPPED | OUTPATIENT
Start: 2017-02-03 | End: 2018-01-29

## 2017-02-03 RX ORDER — PREDNISONE 10 MG/1
30 TABLET ORAL
Qty: 12 TABLET | Refills: 0 | Status: ON HOLD | OUTPATIENT
Start: 2017-02-03 | End: 2017-02-20

## 2017-02-03 RX ORDER — ATORVASTATIN CALCIUM 40 MG/1
40 TABLET, FILM COATED ORAL DAILY
Status: DISCONTINUED | OUTPATIENT
Start: 2017-02-03 | End: 2017-02-04 | Stop reason: HOSPADM

## 2017-02-03 RX ORDER — NITROGLYCERIN 0.4 MG/1
TABLET SUBLINGUAL
Qty: 100 TABLET | Refills: 11 | Status: SHIPPED | OUTPATIENT
Start: 2017-02-03

## 2017-02-03 RX ORDER — ATORVASTATIN CALCIUM 40 MG/1
40 TABLET, FILM COATED ORAL DAILY
Qty: 30 TABLET | Refills: 11 | Status: SHIPPED | OUTPATIENT
Start: 2017-02-03 | End: 2018-01-29

## 2017-02-03 RX ORDER — CLOPIDOGREL BISULFATE 75 MG/1
75 TABLET ORAL DAILY
Qty: 30 TABLET | Refills: 0 | Status: CANCELLED | OUTPATIENT
Start: 2017-02-03

## 2017-02-03 RX ADMIN — PANTOPRAZOLE SODIUM 40 MG: 40 TABLET, DELAYED RELEASE ORAL at 05:22

## 2017-02-03 RX ADMIN — ACETAMINOPHEN 650 MG: 325 TABLET, FILM COATED ORAL at 08:51

## 2017-02-03 RX ADMIN — ASPIRIN 81 MG: 81 TABLET, COATED ORAL at 08:46

## 2017-02-03 RX ADMIN — ATORVASTATIN CALCIUM 40 MG: 40 TABLET, FILM COATED ORAL at 08:47

## 2017-02-03 RX ADMIN — CLOPIDOGREL 75 MG: 75 TABLET, FILM COATED ORAL at 08:46

## 2017-02-03 RX ADMIN — PREDNISONE 40 MG: 20 TABLET ORAL at 08:46

## 2017-02-03 RX ADMIN — SODIUM CHLORIDE 125 ML/HR: 9 INJECTION, SOLUTION INTRAVENOUS at 05:22

## 2017-02-03 NOTE — PLAN OF CARE
Problem: Patient Care Overview (Adult)  Goal: Plan of Care Review  Outcome: Ongoing (interventions implemented as appropriate)    02/03/17 1807   Coping/Psychosocial Response Interventions   Plan Of Care Reviewed With patient   Patient Care Overview   Progress improving   Outcome Evaluation   Outcome Summary/Follow up Plan Pt SR on tele, vitals stable. Sats >90% on RA. No complaints voiced. Will monitor overnight. Probable dc in am.       Goal: Adult Individualization and Mutuality  Outcome: Ongoing (interventions implemented as appropriate)  Goal: Discharge Needs Assessment  Outcome: Ongoing (interventions implemented as appropriate)    Problem: COPD, Chronic Bronchitis/Emphysema (Adult)  Goal: Signs and Symptoms of Listed Potential Problems Will be Absent or Manageable (COPD, Chronic Bronchitis/Emphysema)  Outcome: Ongoing (interventions implemented as appropriate)    Problem: Acute Coronary Syndrome (ACS) (Adult)  Goal: Signs and Symptoms of Listed Potential Problems Will be Absent or Manageable (Acute Coronary Syndrome)  Outcome: Ongoing (interventions implemented as appropriate)

## 2017-02-03 NOTE — PLAN OF CARE
Problem: Patient Care Overview (Adult)  Goal: Plan of Care Review  Outcome: Ongoing (interventions implemented as appropriate)    02/03/17 0428   Coping/Psychosocial Response Interventions   Plan Of Care Reviewed With patient   Patient Care Overview   Progress progress toward functional goals as expected   Outcome Evaluation   Outcome Summary/Follow up Plan VSS, c/o chest pain x1 was relieved with prn pain meds, appeared to sleep throughout most of shift. TR band removed without issue.       Goal: Discharge Needs Assessment  Outcome: Ongoing (interventions implemented as appropriate)    Problem: Acute Coronary Syndrome (ACS) (Adult)  Goal: Signs and Symptoms of Listed Potential Problems Will be Absent or Manageable (Acute Coronary Syndrome)  Outcome: Ongoing (interventions implemented as appropriate)    02/03/17 0428   Acute Coronary Syndrome (ACS)   Problems Assessed (Acute Coronary Syndrome (ACS)) all   Problems Present (Acute Coronary Syndrome (ACS)) none

## 2017-02-03 NOTE — PROGRESS NOTES
"      HOSPITALIST DAILY PROGRESS NOTE    Chief Complaint: SOA, syncope    Subjective   SUBJECTIVE/OVERNIGHT EVENTS   Still having some intermittent dyspnea with chest tightness when taking deep breaths. Does have a cough, thick blood tinged sputum. Still requiring supplemental O2 to maintain SpO2 > 88%. Appears very anxious and is concerned about where he will go when discharged as he is from [place] and was dependent on his income from the job he was completing in [place].     Review of Systems:  Gen-no fevers, no chills  CV-no chest pain, no palpitations,   Resp-+ chest tightness with deep breathing, +cough  GI-no N/V/D, no abd pain    Objective   OBJECTIVE   I have reviewed the vital signs.  Visit Vitals   • /71   • Pulse 57   • Temp 98.2 °F (36.8 °C) (Oral)   • Resp 16   • Ht 70\" (177.8 cm)   • Wt 148 lb 11.2 oz (67.4 kg)   • SpO2 98%   • BMI 21.34 kg/m2       Physical Exam:  Gen-no acute distress, sitting up in bed  CV-RRR, S1 S2 normal, no m/r/g  Resp-crackles cleared by cough RLL, scattered wheezes bilaterally  Abd-soft, NT, ND, +BS  Ext-no edema  Neuro-A&Ox4, no focal deficits  Psych-appropriate mood, mildly anxious    Results:  I have reviewed the labs, culture data, radiology results, and diagnostic studies.      Results from last 7 days  Lab Units 02/03/17  0641 02/02/17  0740 02/01/17  1408   WBC 10*3/mm3 8.80 3.61 5.21   HEMOGLOBIN g/dL 11.6* 13.4 13.6   HEMATOCRIT % 37.4* 42.2 42.6   PLATELETS 10*3/mm3 180 205 210       Results from last 7 days  Lab Units 02/03/17  0641   SODIUM mmol/L 139   POTASSIUM mmol/L 4.4   CHLORIDE mmol/L 110*   TOTAL CO2 mmol/L 26.0   BUN mg/dL 17   CREATININE mg/dL 0.90   GLUCOSE mg/dL 80   CALCIUM mg/dL 8.5*       Radiology Results:  Imaging Results (last 24 hours)     Procedure Component Value Units Date/Time    XR Chest 1 View [[id number]] Collected:  02/01/17 1536     Updated:  02/01/17 1626    Narrative:       EXAMINATION: XR CHEST 1 VW- 02/01/2017     INDICATION: Chest " Pain triage protocol      COMPARISON: NONE     FINDINGS: The cardiac silhouette is normal. There is no pulmonary  inflammatory process, mass or effusion.           Impression:       Chronic change; no active disease.     D:  02/01/2017  E:  02/01/2017        This report was finalized on 2/1/2017 4:24 PM by Dr. Aniket Singh MD.       CT Angiogram Chest With Contrast [[id number]] Collected:  02/02/17 1130     Updated:  02/02/17 1130    Narrative:       EXAMINATION: CT ANGIOGRAM CHEST W CONTRAST-      INDICATION: PE.     TECHNIQUE: CT angiogram of the chest was performed following bolus  infusion of contrast. Images are displayed in the axial and coronal  projections (2-D).     The radiation dose reduction device was turned on for each scan per the  ALARA (As Low as Reasonably Achievable) protocol.     COMPARISON: None.     FINDINGS: There is no axillary lymphadenopathy. There is no mediastinal  or hilar adenopathy. There is no pulmonary embolus. There is no  pericardial effusion. The thoracic aorta demonstrates areas of  calcification but, otherwise is unremarkable. There is no pleural  effusion. Images displayed at lung window settings reveal no acute  inflammatory process, mass or nodule.       Impression:       Negative CT angiogram of the chest.     D:  02/02/2017  E:  02/02/2017              I have reviewed the medications.      Assessment/Plan   ASSESSMENT/PLAN    Principal Problem:    Coronary artery disease involving native coronary artery of native heart with unstable angina pectoris  Active Problems:    COPD exacerbation    GERD (gastroesophageal reflux disease)    Essential hypertension    Hyperlipidemia LDL goal <100    Syncope   80 yo  resident of [place] who presents after an acute syncopal event with hypoxia and c/o weeks of malaise and cough.     # Acute hypoxic respiratory failure- suspect 2/2 COPD exacerbation vs acute decompensated CHF  # COPD exacerbation, mild  # Syncopal event  # Unstable  angina- cath 2/2/17 with [place] MENDEL to left main/proximal LAD. LVEF 70%  # TAMAR, likely pre-renal- resolved      Plan  - continue steroids, continue Duonebs  - OK to DC from cardiology standpoint. Will need DAPT (Asa/plavix) x 1 year, high potency statin . NO beta blocker due to baseline bradycardia. Follow up with PCP in [place] for CMP and Lipids in 6 weeks. Will need to establish care with cardiologist in [place].    More than 50% of time spent counseling on current illness and plan of care. Case discussed with: the patient and Dr. Short.  Total time of the encounter was 35 minutes.    I expect patient to be discharged in: 1-2 days to home    KALEE Bueno  02/03/17  9:01 AM

## 2017-02-03 NOTE — PROGRESS NOTES
[facility]  IP Progress Note      Chief Complaint/Reason for service:    · Unstable Angina       The patient has had no chest pain symptoms overnight.  His left wrist has been stable.  He is not gotten out of bed or ambulated.    Past medical, surgical, social and family history reviewed in the patient's electronic medical record.    Review of Systems:   All pertinent positives listed in HPI all other systems reviewed and were negative.       Vital Sign Min/Max for last 24 hours  Temp  Min: 97.8 °F (36.6 °C)  Max: 98.2 °F (36.8 °C)   BP  Min: 112/74  Max: 150/73   Pulse  Min: 56  Max: 71   Resp  Min: 16  Max: 20   SpO2  Min: 95 %  Max: 98 %   Flow (L/min)  Min: 2  Max: 2      Intake/Output Summary (Last 24 hours) at 02/03/17 0809  Last data filed at 02/03/17 0037   Gross per 24 hour   Intake    890 ml   Output   1200 ml   Net   -310 ml           Physical Exam   Constitutional: He is oriented to person, place, and time. He appears well-developed and well-nourished.   HENT:   Head: Normocephalic and atraumatic.   Eyes: No scleral icterus.   Neck: No JVD present.   Cardiovascular: Normal rate and regular rhythm.    No murmur heard.  Pulmonary/Chest: Effort normal. He has no wheezes.   Abdominal: Soft. Bowel sounds are normal.   Neurological: He is alert and oriented to person, place, and time.   Skin: Skin is warm and dry.   Psychiatric: He has a normal mood and affect. His behavior is normal.   Left wrist cath access site soft with no bleeding or hematoma noted.    Results Review:   I reviewed the patient's recent labs in the electronic medical record.          Tele:  Sinus Bradycardia to normal sinus rhythm.          Hospital Problem List     * (Principal)Coronary artery disease involving native coronary artery of native heart with unstable angina pectoris    Overview Signed 2/2/2017  8:07 PM by Osman Mujica MD     · Cardiac catheterization for unstable angina  (2/2/17): Hemodynamically significant distal left main/LAD disease status post MENDEL to the left main/LAD (Xience 2.75 x 38 mm).  Left main postdilated to 4.0 mm.  Normal LVEF.         COPD exacerbation    GERD (gastroesophageal reflux disease)    Essential hypertension    Hyperlipidemia LDL goal <100    Syncope        The patient had a moderate left main/proximal LAD stenosis that proved to be hemodynamically significant by fractional flow reserve testing.  This was treated with a single drug-eluting stent.  It is unclear to me presently whether this accounts for the entirety of his constellation of symptoms.  Specifically, the patient has been having a loss of appetite which I cannot explain from a cardiac perspective.  I suspect is some of his symptoms may be pulmonary related.         · Okay for discharge from cardiac standpoint.  · Patient is safe to travel home from cardiac standpoint.  · DAPT (aspirin 81 mg + clopidogrel 75 mg daily) will be needed for at least one year and preferably indefinitely.  · I've spoken with [facility] (inpatient pharmacy) and we will supply the patient with 30 days of clopidogrel for meds to [facility] program prior to discharge.  · No beta blocker due to baseline bradycardia  · Patient will need to establish cardiology care in his home state [place] upon return home.  · Patient will need close follow-up with PCP once home to [place].    Osman Mujica MD  2/3/2017

## 2017-02-03 NOTE — PROGRESS NOTES
Continued Stay Note  [facility]     Patient Name: Pavel Reyes  MRN: [id number]  Today's Date: 2/3/2017    Admit Date: 2/1/2017          Discharge Plan       02/03/17 1038    Case Management/Social Work Plan    Additional Comments Pharmacist reported to CM and ALBIN that pt does not have medication coverage and has no money. Pt reported that he came from [place] to work a job and the job fell through and pt has no money currently to pay for any prescriptions. GINA called retail pharmacy and pt's four prescriptions come to $88.79. ALBIN filled pt's medications at the pharmacy as they are cardiac meds and pt will need them. ALBIN filled pt's meds through the indigent fund.               Discharge Codes     None        Expected Discharge Date and Time     Expected Discharge Date Expected Discharge Time    Feb 3, 2017             JALEN Salinas

## 2017-02-04 VITALS
BODY MASS INDEX: 21.29 KG/M2 | HEIGHT: 70 IN | WEIGHT: 148.7 LBS | OXYGEN SATURATION: 92 % | RESPIRATION RATE: 20 BRPM | SYSTOLIC BLOOD PRESSURE: 104 MMHG | DIASTOLIC BLOOD PRESSURE: 56 MMHG | TEMPERATURE: 97.7 F | HEART RATE: 59 BPM

## 2017-02-04 PROBLEM — E11.9 TYPE 2 DIABETES MELLITUS WITHOUT COMPLICATION (HCC): Status: ACTIVE | Noted: 2017-02-04

## 2017-02-04 PROCEDURE — 94760 N-INVAS EAR/PLS OXIMETRY 1: CPT

## 2017-02-04 PROCEDURE — 94640 AIRWAY INHALATION TREATMENT: CPT

## 2017-02-04 PROCEDURE — 63710000001 PREDNISONE PER 5 MG: Performed by: INTERNAL MEDICINE

## 2017-02-04 PROCEDURE — 99239 HOSP IP/OBS DSCHRG MGMT >30: CPT | Performed by: PHYSICIAN ASSISTANT

## 2017-02-04 RX ORDER — PREDNISONE 10 MG/1
10 TABLET ORAL
Status: DISCONTINUED | OUTPATIENT
Start: 2017-02-05 | End: 2017-02-04 | Stop reason: HOSPADM

## 2017-02-04 RX ADMIN — PREDNISONE 40 MG: 20 TABLET ORAL at 09:19

## 2017-02-04 RX ADMIN — IPRATROPIUM BROMIDE AND ALBUTEROL SULFATE 3 ML: .5; 3 SOLUTION RESPIRATORY (INHALATION) at 00:24

## 2017-02-04 RX ADMIN — IPRATROPIUM BROMIDE AND ALBUTEROL SULFATE 3 ML: .5; 3 SOLUTION RESPIRATORY (INHALATION) at 13:07

## 2017-02-04 RX ADMIN — IPRATROPIUM BROMIDE AND ALBUTEROL SULFATE 3 ML: .5; 3 SOLUTION RESPIRATORY (INHALATION) at 07:16

## 2017-02-04 RX ADMIN — CLOPIDOGREL 75 MG: 75 TABLET, FILM COATED ORAL at 09:20

## 2017-02-04 RX ADMIN — ATORVASTATIN CALCIUM 40 MG: 40 TABLET, FILM COATED ORAL at 09:20

## 2017-02-04 RX ADMIN — PANTOPRAZOLE SODIUM 40 MG: 40 TABLET, DELAYED RELEASE ORAL at 05:05

## 2017-02-04 RX ADMIN — ASPIRIN 81 MG: 81 TABLET, COATED ORAL at 09:20

## 2017-02-04 NOTE — DISCHARGE PLACEMENT REQUEST
"Pavel Amaya (81 y.o. Male)     Date of Birth Social Security Number Address Home Phone MRN    1935  [address] [place] [address] [phone] [id number]    [employer] Marital Status          None        Admission Date Admission Type Admitting Provider Attending Provider Department, Room/Bed    2/1/17 Emergency Mar Short MD Howard, Gabriela Kirk, MD [facility] 6A, N614/1    Discharge Date Discharge Disposition Discharge Destination         Home or Self Care             Attending Provider: Mar Short MD     Allergies:  No Known Allergies    Isolation:  None   Infection:  None   Code Status:  FULL    Ht:  70\" (177.8 cm)   Wt:  148 lb 11.2 oz (67.4 kg)    Admission Cmt:  None   Principal Problem:  Coronary artery disease involving native coronary artery of native heart with unstable angina pectoris [I25.110] More...                 Active Insurance as of 2/1/2017     Primary Coverage     Payor Plan Insurance Group Employer/Plan Group    MEDICARE MEDICARE A & B      Payor Plan Address Payor Plan Phone Number Effective From Effective To    [address] [phone] 10/1/2001     [place] [address]       Subscriber Name Subscriber Birth Date Member ID       PAVEL AMAYA 1935 [id number]                 Emergency Contacts      (Rel.) Home Phone Work Phone Mobile Phone    Kelle Amaya (Spouse) [phone] -- --            Insurance Information                MEDICARE/MEDICARE A & B Phone: [phone]    Subscriber: Pavel Amaya Subscriber#: [id number]    Group#:  Precert#:           Problem List           Codes Noted - Resolved       Hospital    * (Principal)Coronary artery disease involving native coronary artery of native heart with unstable angina pectoris ICD-10-CM: I25.110  ICD-9-CM: 414.01, 411.1 2/2/2017 - Present    COPD exacerbation ICD-10-CM: J44.1  ICD-9-CM: 491.21 2/1/2017 - Present    GERD (gastroesophageal reflux " "disease) ICD-10-CM: K21.9  ICD-9-CM: 530.81 2/1/2017 - Present    Essential hypertension ICD-10-CM: I10  ICD-9-CM: 401.9 2/1/2017 - Present    Hyperlipidemia LDL goal <100 ICD-10-CM: E78.5  ICD-9-CM: 272.4 2/1/2017 - Present    Syncope ICD-10-CM: R55  ICD-9-CM: 780.2 2/1/2017 - Present       Non-Hospital    Hypoxia ICD-10-CM: R09.02  ICD-9-CM: 799.02 2/1/2017 - Present    History of tobacco abuse ICD-10-CM: Z87.891  ICD-9-CM: V15.82 2/1/2017 - Present             History & Physical      Ivy Murphy MD at 2/1/2017  7:37 PM            [facility] Medicine Services  HISTORY AND PHYSICAL    Primary Care Physician: No Known Provider    Subjective     Chief Complaint: dizziness, syncope, shortness of air, chest pain    History of Present Illness  This is a pleasant 81 year male from [place] who is 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  Over the past 5-7 days he has noticed while working and lifting, increased shortness of breath along with dull pressure to his mid chest.  He states the chest pressure does not last very long but has been intermittent.  Today while working he states he became again more short of breath and started having dizziness and \"passed out.\"  He recalls the event and states he doesn't think he was \"out\" very long and once he came to he immediately got in his truck and drove to the ED.  He denies any fever, chills, cough, nausea, vomiting, or diarrhea.  Over the last several days he states his appetite has poor and he has not been drinking very much.  He currently is chest pain free and states his dyspnea is better after the neb treatment and steroids.  He will be admitted to [facility] under the care of the Hospitalist for further evaluation and treatment.         Review of Systems   Constitutional: Positive for appetite change. Negative for chills, diaphoresis, fatigue and fever.   HENT: Negative for congestion, postnasal drip, rhinorrhea, " sneezing and sore throat.    Eyes: Negative.    Respiratory: Positive for shortness of breath. Negative for cough and wheezing.    Cardiovascular: Positive for chest pain. Negative for palpitations and leg swelling.   Gastrointestinal: Negative for abdominal distention, abdominal pain, blood in stool, constipation, diarrhea, nausea and vomiting.   Endocrine: Negative.    Genitourinary: Negative for difficulty urinating, dysuria, flank pain, hematuria and urgency.   Musculoskeletal: Negative.    Skin: Negative.    Allergic/Immunologic: Negative.    Neurological: Positive for dizziness, syncope and light-headedness. Negative for tremors, seizures, facial asymmetry, speech difficulty, weakness, numbness and headaches.   Hematological: Negative.    Psychiatric/Behavioral: Negative.       Otherwise complete ROS reviewed and negative except as mentioned in the HPI.      Past Medical History:   Past Medical History   Diagnosis Date   • Arthritis    • COPD (chronic obstructive pulmonary disease)    • GERD (gastroesophageal reflux disease)    • Hypertension        Past Surgical History:  Past Surgical History   Procedure Laterality Date   • Abdominal surgery         Family History:   Family History   Problem Relation Age of Onset   • Heart disease Father      Social History:   Social History     Social History   • Marital status:      Spouse name: N/A   • Number of children: N/A   • Years of education: N/A     Occupational History   • Not on file.     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.         Medications:    (Not in a hospital admission)  Allergies:  No Known Allergies    Objective     Vital Signs:  "  Visit Vitals   • /73   • Pulse 59   • Temp 97.6 °F (36.4 °C) (Oral)   • Resp 16   • Ht 70\" (177.8 cm)   • Wt 140 lb (63.5 kg)   • SpO2 96%   • BMI 20.09 kg/m2     Physical Exam   Constitutional: He appears well-developed and well-nourished. No distress.   Alert and oriented x3 pleasant cooperative.  Appears comfortable and in no acute distress. Speaks clear full sentences.  No family is at bedside.    HENT:   Head: Normocephalic.   Eyes: Conjunctivae and EOM are normal. Pupils are equal, round, and reactive to light. Right eye exhibits no discharge. Left eye exhibits no discharge. No scleral icterus.   Neck: Normal range of motion. Neck supple. No JVD present. No tracheal deviation present. No thyromegaly present.   Cardiovascular: Normal rate, regular rhythm, normal heart sounds and intact distal pulses.  Exam reveals no gallop and no friction rub.    No murmur heard.  Pulmonary/Chest: Effort normal and breath sounds normal. No stridor. No respiratory distress. He has no wheezes. He has no rales. He exhibits no tenderness.   Abdominal: Soft. Bowel sounds are normal. He exhibits no distension and no mass. There is no tenderness. There is no rebound and no guarding. No hernia.   Musculoskeletal: Normal range of motion. He exhibits no tenderness or deformity.   Lymphadenopathy:     He has no cervical adenopathy.   Neurological: He is alert. He has normal reflexes.   Skin: Skin is warm and dry. No rash noted. He is not diaphoretic. No erythema. No pallor.   Psychiatric: He has a normal mood and affect. His behavior is normal. Judgment and thought content normal.   Vitals reviewed.      Results Reviewed:    Results from last 7 days  Lab Units 02/01/17  1408   WBC 10*3/mm3 5.21   HEMOGLOBIN g/dL 13.6   PLATELETS 10*3/mm3 210       Results from last 7 days  Lab Units 02/01/17  1408   SODIUM mmol/L 138   POTASSIUM mmol/L 5.1   TOTAL CO2 mmol/L 29.0   CREATININE mg/dL 1.50*   GLUCOSE mg/dL 96   CALCIUM mg/dL 9.8 "     Imaging Results (last 24 hours)     Procedure Component Value Units Date/Time    CT Angiogram Chest With Contrast [[id number]]      Updated:  02/01/17 1625    XR Chest 1 View [[id number]] Collected:  02/01/17 1536     Updated:  02/01/17 1626    Narrative:       EXAMINATION: XR CHEST 1 VW- 02/01/2017     INDICATION: Chest Pain triage protocol      COMPARISON: NONE     FINDINGS: The cardiac silhouette is normal. There is no pulmonary  inflammatory process, mass or effusion.           Impression:       Chronic change; no active disease.     D:  02/01/2017  E:  02/01/2017        This report was finalized on 2/1/2017 4:24 PM by Dr. Aniket Singh MD.               I have personally reviewed and interpreted the radiology studies and ECG obtained at time of admission.     Assessment / Plan      Assessment & Plan  Principal Problem:    Syncope  Active Problems:    COPD exacerbation    Hypoxia    TMAAR (acute kidney injury)    GERD (gastroesophageal reflux disease)    History of tobacco abuse    Hypertension    Hyperlipidemia    1) Syncope  -etiology unclear  -will continue IVF, troponin X2 <0.006 and 0.001  -EKG unremarkable  -CTA negative for PE  -fall precautions  -will get Echo in am  -check TSH, FLP, Hgb A1c  -consult cardiology to see in am  -check orthostatics bid    2) Hypoxia  -upon arrival to ED o2 sat was 86 %  -will continue IV steroids  -continuous pulse ox  -cxr showed no acute changes, mass or effusion  -CTA negative for PE  -BNP 73    3) TAMAR  -unknown baseline  -likely secondary to volume depletion  -continue IVF  -check urine sodium, creatinine and protein  -hold lisinopril    4) Unstable angina  -two sets troponin negative  -will continue to trend  -consult cardiology in am  -ASA given in ED  -ANJALI: 3  -NPO after midnight   -prn sl nitro for chest pain  -repeat EKG in am    5) Essential HTN  -hold lisinopril for now due to #3    6) Hyperlipidemia  -increase statin from 40 to 80mg    7) DVT  prophylaxis  -teds/scds, heparin    8) Code status:   -full code        I discussed the patients findings and my recommendations with:patient and primary care team.     Shanna ChiuKALEE 02/01/17 7:37 PM      Brief Attending Note       I have seen and examined the patient, performing an independent face-to-face diagnostic evaluation with plan of care reviewed and developed with the advanced practice clinician (APC).      Brief Summary Statement/HPI:   81 yoM with hx of COPD p/w 5-7 days of increased dyspnea on exertion and chest discomfort.He mentions that this afternoon he passed out, thinks he was down for a short while, and after waking up he drove himself to the ED. Chest pain is substernal, non-radiating, denies any fevers or chills, no n/v/d,     Attending Physical Exam:  Temp:  [97.6 °F (36.4 °C)] 97.6 °F (36.4 °C)  Heart Rate:  [] 59  Resp:  [16-22] 16  BP: ()/(51-73) 118/73     Constitutional: no acute distress, awake, alert  Eyes: PERRLA, sclerae anicteric, no conjunctival injection  Neck: supple, no thyromegaly, trachea midline  Respiratory: Clear to auscultation bilaterally, nonlabored respirations   Cardiovascular: RRR, no murmurs, rubs, or gallops, palpable pedal pulses bilaterally  Gastrointestinal: Positive bowel sounds, soft, nontender, nondistended  Musculoskeletal: No bilateral ankle edema, no clubbing or cyanosis to bilateral lower extremities  Psychiatric: oriented x 3, appropriate affect, cooperative  Neurologic: Strength symmetric in all extremities, Cranial Nerves grossly intact to confrontation       Brief Assessment/Plan :  # Acute hypoxic respiratory failure, etiology unknown, suspected COPD exacerbation vs HF  # COPD exacerbation, mild  # suspected syncope  # Unstable angina  # TAMAR, likely pre-renal    Plan  - s/p steroids, continue Duonebs  - CT: PE negative for PE  - Trend tropinin, repeat EKG in a.m  - Echo  - cardiology consult    INPATIENT status due to the need for  care which can only be reasonably provided in an hospital setting such as aggressive/expedited ancillary services and/or consultation services and the necessity for IV medications, close physician monitoring and/or the possible need for procedures.  In such, I feel patient’s risk for adverse outcomes and need for care warrant INPATIENT evaluation and predict the patient’s care encounter to likely last beyond 2 midnights.    Ivy Murphy MD  02/01/17  8:18 PM              Electronically signed by Ivy Murphy MD at 2/1/2017  8:49 PM        Vital Signs (last 24 hours)       02/03 0700  -  02/04 0659 02/04 0700  -  02/04 0955   Most Recent    Temp (°F) 97.8 -  98.5       98.5 (36.9)    Heart Rate 56 -  65    51 -  59     59    Resp 14 -  20      16     16    /58 -  137/88      104/56     104/56    SpO2 (%) (!)86 -  98    92 -  94     92          Hospital Medications (active)       Dose Frequency Start End    acetaminophen (TYLENOL) tablet 650 mg 650 mg Every 6 Hours PRN 2/1/2017     Sig - Route: Take 2 tablets by mouth Every 6 (Six) Hours As Needed for mild pain (1-3). - Oral    aspirin EC tablet 81 mg 81 mg Daily 2/2/2017     Sig - Route: Take 1 tablet by mouth Daily. - Oral    atorvastatin (LIPITOR) tablet 40 mg 40 mg Daily 2/3/2017     Sig - Route: Take 1 tablet by mouth Daily. - Oral    bisacodyl (DULCOLAX) EC tablet 5 mg 5 mg Daily PRN 2/1/2017     Sig - Route: Take 1 tablet by mouth Daily As Needed for constipation. - Oral    clopidogrel (PLAVIX) tablet 75 mg 75 mg Daily 2/3/2017     Sig - Route: Take 1 tablet by mouth Daily. - Oral    HYDROcodone-acetaminophen (NORCO) 7.5-325 MG per tablet 1 tablet 1 tablet Every 4 Hours PRN 2/2/2017 2/12/2017    Sig - Route: Take 1 tablet by mouth Every 4 (Four) Hours As Needed for moderate pain (4-6). - Oral    HYDROmorphone (DILAUDID) injection 0.5 mg 0.5 mg Every 2 Hours PRN 2/2/2017 2/12/2017    Sig - Route: Infuse 0.5 mg into a venous catheter Every 2 (Two) Hours  "As Needed for severe pain (7-10). - Intravenous    Linked Group 1:  \"And\" Linked Group Details        ipratropium-albuterol (DUO-NEB) nebulizer solution 3 mL 3 mL Every 6 Hours - RT 2/1/2017     Sig - Route: Take 3 mL by nebulization Every 6 (Six) Hours. - Nebulization    ipratropium-albuterol (DUO-NEB) nebulizer solution 3 mL 3 mL Every 4 Hours PRN 2/1/2017     Sig - Route: Take 3 mL by nebulization Every 4 (Four) Hours As Needed for shortness of air. - Nebulization    naloxone (NARCAN) injection 0.4 mg 0.4 mg Every 5 Minutes PRN 2/2/2017     Sig - Route: Infuse 1 mL into a venous catheter Every 5 (Five) Minutes As Needed for respiratory depression. - Intravenous    Linked Group 1:  \"And\" Linked Group Details        ondansetron (ZOFRAN) injection 4 mg 4 mg Every 6 Hours PRN 2/1/2017     Sig - Route: Infuse 2 mL into a venous catheter Every 6 (Six) Hours As Needed for nausea or vomiting. - Intravenous    pantoprazole (PROTONIX) EC tablet 40 mg 40 mg Every Early Morning 2/2/2017     Sig - Route: Take 1 tablet by mouth Every Morning. - Oral    Pharmacy Consult - MT  Daily 2/3/2017     Sig - Route: Daily. - Does not apply    Pharmacy Meds to Bed Consult  Daily (Monday-Friday) 2/3/2017     Sig - Route: Daily (Monday-Friday). - Does not apply    predniSONE (DELTASONE) tablet 40 mg 40 mg Daily With Breakfast 2/3/2017     Sig - Route: Take 2 tablets by mouth Daily With Breakfast. - Oral    sodium chloride 0.9 % flush 1-10 mL 1-10 mL As Needed 2/1/2017     Sig - Route: Infuse 1-10 mL into a venous catheter As Needed for line care. - Intravenous    sodium chloride 0.9 % flush 10 mL 10 mL As Needed 2/1/2017     Sig - Route: Infuse 10 mL into a venous catheter As Needed for line care. - Intravenous    Cosign for Ordering: Accepted by Cleve Mccormack MD on 2/1/2017  6:14 PM    sodium chloride 0.9 % infusion 125 mL/hr Continuous 2/1/2017     Sig - Route: Infuse 125 mL/hr into a venous catheter Continuous. - Intravenous    "         Lab Results (last 24 hours)     Procedure Component Value Units Date/Time    Protein Electrophoresis, Urine [[id number]] Collected:  17 0521    Specimen:  Urine from Urine, Clean Catch Updated:  17 1630     Total Protein, Urine 6.2 mg/dL      Protein, 24H Urine Comment mg/24 hr       No total volume submitted.  Unable to calculate 24 hour result.        Albumin, U 16.8 %      Alpha-1-Globulin, U 6.3 %      Alpha-2-Globulin, U 25.9 %      Beta Globulin, U 27.5 %      Gamma Globulin, Urine 23.5 %      M-Miller, % Comment: %       ASYMMETRICAL GAMMA        M-Miller, mg/24 hr Comment mg/24 hr       No total volume submitted.  Unable to calculate 24 hour result.        Please note Comment       Protein electrophoresis scan will follow via computer, mail, or   delivery.       Narrative:       Performed at:  [address] [place] [address]  [id number]  : Leonel Sow PhD, Phone:  [phone]        Imaging Results (last 24 hours)     ** No results found for the last 24 hours. **        Physician Progress Notes (last 24 hours) (Notes from 2/3/2017  9:55 AM through 2017  9:55 AM)     No notes of this type exist for this encounter.        Consult Notes (last 24 hours) (Notes from 2/3/2017  9:55 AM through 2017  9:55 AM)     No notes of this type exist for this encounter.        [address] [address]  Phone: [phone]  Fax:         Patient:     Pavel Reyes MRN: [id number]   [address] [place] [address] : 1935  SSN:    Phone: [phone] Sex: M      INSURANCE PAYOR PLAN GROUP # SUBSCRIBER ID   Primary: MEDICARE [id number]   [id number]   Admitting Diagnosis: COPD exacerbation [J44.1]  Order Date: 2017               Oxygen Therapy    (Order ID: [id number])   Diagnosis: COPD exacerbation (J44.1 [ICD-10-CM] 491.21 [ICD-9-CM])      Priority: Routine Expected Date:   Expiration  Date:        Interval:  Count:    Delivery Modality: Nasal Cannula  Liters Per Minute: 2  Duration: During Sleep  Equipment:  Oxygen Concentrator &  &  Portable Gaseous Oxygen System & Portable Oxygen Contents Gaseous &  Conserving Regulator  Length of Need (99 Months = Lifetime): 99 Months = Lifetime     Specimen Type:   Specimen Source:   Specimen Taken Date:   Specimen Taken Time:         Electronically Signed by: Mar Short MD 2/4/2017 9:55 AM                     Electronically signed by: Mar Short MD (NPI: [id number])         SpO2    96 %    85 %  86 %      Pulse Oximetry Type    Continuous           SpO2: Pre-Ductal (Right Hand)               SpO2: Post-Ductal (Left Hand/Foot)               O2 Device  nasal cannula  nasal cannula  nasal cannula  room air  room air       Respiratory Therapy Notes (last 24 hours) (Notes from 2/3/2017  9:56 AM through 2/4/2017  9:56 AM)     No notes of this type exist for this encounter.

## 2017-02-04 NOTE — DISCHARGE SUMMARY
"        [facility] Medicine Services  DISCHARGE SUMMARY       Date of Admission: 2/1/2017  Date of Discharge:  2/4/2017  Primary Care Physician: No Known Provider    Discharge Diagnoses:  Active Hospital Problems (** Indicates Principal Problem)    Diagnosis Date Noted   • **Coronary artery disease involving native coronary artery of native heart with unstable angina pectoris [I25.110] 02/02/2017   • Type 2 diabetes mellitus without complication, Hgb A1C = 6.6% Feb 2017 [E11.9] 02/04/2017   • COPD exacerbation [J44.1] 02/01/2017   • GERD (gastroesophageal reflux disease) [K21.9] 02/01/2017   • Essential hypertension [I10] 02/01/2017   • Hyperlipidemia LDL goal <100 [E78.5] 02/01/2017   • Syncope [R55] 02/01/2017      Resolved Hospital Problems    Diagnosis Date Noted Date Resolved   • Unstable angina [I20.0] 02/02/2017 02/02/2017   • TAMAR (acute kidney injury) [N17.9] 02/01/2017 02/02/2017     Presenting Problem/History of Present Illness  COPD exacerbation [J44.1]     Chief Complaint on Day of Discharge:     History of Present Illness on Day of Discharge:     Hospital Course  Mr. 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 He presented to [facility] on 2/1/17 following a syncopal episode.  The patient reported that over the past 5-7 days he had noticed increased shortness of breath and dull mid chest pressure while working and lifting.  He reported that the chest pressure was short-lived and intermittent.  On day of presentation, he stated that he became more short of breath and began to feel dizzy and \"passed out.\"  He recall the event and stated that he didn't think he was \"out\".  Long and once he came to, he immediately got in his truck and drove to the ED.  He was admitted to the hospitalist service for further evaluation and treatment.    ED workup revealed negative serial troponins. Mr. Reyes was noted " to be hypoxic with oxygen saturation of 86%, he was given IV steroids.  Chest x-ray showed no acute changes and CT was negative for PE.  Creatinine was noted to be elevated at 1.5, baseline creatinine is unknown.  Patient was started on IV fluids.  Echocardiogram was pursued and revealed normal left ventricular function with estimated ejection fraction of 70% and grade 1A left ventricular diastolic dysfunction consistent with impaired relaxation.  Cardiology service was consulted and Dr. Braxton Mujica saw the patient.  He recommended left heart catheterization, Plavix 300 mg ×1 and aspirin 325 mg ×1.  LHC on 2/2/17 showed hemodynamically significant distal left main/LAD disease and a drug-eluting stent was placed to the left main/LAD.  Recommended dual antiplatelet therapy ×12 months and atorvastatin 40 mg QHS.     Due to the patient's social situation, the [facility] was able to supply 30 days of medications for the patient.  He is to follow-up with his primary care provider in [place] following return to home.  He is encouraged to establish care with a cardiologist and follow-up in 6 weeks.  He will need repeat CMP and lipids in 6 weeks.  Hemoglobin A1c noted to be 6.6% during this hospitalization.  No medication therapy was initiated during this hospitalization the patient was encouraged to follow-up with his primary care provider for further treatment and monitoring.  Creatinine has returned to baseline and is 0.9 on day of discharge.  He will be discharged to home with his daughter in [place].  [facility] will supply home oxygen while the patient is in the area.  He is to contact [facility] in his home area when he returns home to obtain further oxygen therapy.    Procedures Performed  Left Heart Cath  Stent MENDEL coronary  Optical Coherent Tomography    Consults:   Consults     Date and Time Order Name Status Description    2/2/2017 0436 Inpatient Consult to Cardiology Completed         Pertinent Test  Results:    Results from last 7 days  Lab Units 02/03/17  0641 02/02/17  0740 02/01/17  1408   WBC 10*3/mm3 8.80 3.61 5.21   HEMOGLOBIN g/dL 11.6* 13.4 13.6   HEMATOCRIT % 37.4* 42.2 42.6   PLATELETS 10*3/mm3 180 205 210         Results from last 7 days  Lab Units 02/03/17  0641 02/02/17  0740 02/01/17  1408   SODIUM mmol/L 139 141 138   POTASSIUM mmol/L 4.4 5.2 5.1   CHLORIDE mmol/L 110* 108 102   TOTAL CO2 mmol/L 26.0 27.0 29.0   BUN mg/dL 17 25* 32*   CREATININE mg/dL 0.90 1.00 1.50*   GLUCOSE mg/dL 80 150* 96   CALCIUM mg/dL 8.5* 8.9 9.8       Results from last 7 days  Lab Units 02/02/17  0740   CHOLESTEROL mg/dL 143   TRIGLYCERIDES mg/dL 60   HDL CHOL mg/dL 46       Results from last 7 days  Lab Units 02/02/17  0740   HEMOGLOBIN A1C % 6.60*     Patient Information      Name MRN Description     Pavel Reyes [id number] 81 y.o. Male       Physicians      Panel Physicians Referring Physician Case Authorizing Physician     Osman Mujica MD (Primary)         Procedures      Optical Coherent Tomography     Left Heart Cath     Stent MENDEL coronary       Indications      Unstable angina [I20.0 (ICD-10-CM)]       Pre-procedure Diagnosis      Unstable angina        Post-procedure Diagnosis      Coronary artery disease with hemodynamically significant distal left main disease   Normal LV systolic function          Conclusion       IMPRESSION:  · Hemodynamically significant distal left main/ostial LAD disease by FFR (0.77)  · Normal LV systolic function  · Systemic hypotension.  · Normal LV filling pressure  · Successful oh CT guided PCI of the distal left main/LAD using a Xience 2.75 x 38 mm drug-eluting stent postdilatation to 4 mm proximally.     RECOMMENDATIONS:  · DAPT (aspirin 81 mg + clopidogrel 75 mg daily) for one year.  · Atorvastatin 40 mg daily at bedtime     Pavel Reyes   Acquired echocardiogram 2D complete   Order# [id number]    Reading physician: Pavel Guy MD   Ordering physician: Liberty  KALEE Gold   Study date: 2/2/17         Patient Information      Name MRN Description     Pavel Reyes [id number] 81 y.o. Male       Interpretation Summary      · All left ventricular wall segments contract normally.  · Left ventricular diastolic dysfunction (grade I a) consistent with impaired relaxation.  · Left ventricular function is normal. Estimated EF = 70%.  · Right ventricular cavity is borderline dilated.  · Mild tricuspid valve regurgitation is present.     Imaging Results (all)     Procedure Component Value Units Date/Time    XR Chest 1 View [[id number]] Collected:  02/01/17 1536     Updated:  02/01/17 1626    Narrative:       EXAMINATION: XR CHEST 1 VW- 02/01/2017     INDICATION: Chest Pain triage protocol      COMPARISON: NONE     FINDINGS: The cardiac silhouette is normal. There is no pulmonary  inflammatory process, mass or effusion.           Impression:       Chronic change; no active disease.     D:  02/01/2017  E:  02/01/2017        This report was finalized on 2/1/2017 4:24 PM by Dr. Aniket Singh MD.       CT Angiogram Chest With Contrast [[id number]] Collected:  02/02/17 1130     Updated:  02/03/17 0955    Narrative:       EXAMINATION: CT ANGIOGRAM CHEST W CONTRAST-      INDICATION: PE.     TECHNIQUE: CT angiogram of the chest was performed following bolus  infusion of contrast. Images are displayed in the axial and coronal  projections (2-D).     The radiation dose reduction device was turned on for each scan per the  ALARA (As Low as Reasonably Achievable) protocol.     COMPARISON: None.     FINDINGS: There is no axillary lymphadenopathy. There is no mediastinal  or hilar adenopathy. There is no pulmonary embolus. There is no  pericardial effusion. The thoracic aorta demonstrates areas of  calcification but, otherwise is unremarkable. There is no pleural  effusion. Images displayed at lung window settings reveal no acute  inflammatory process, mass or nodule.       Impression:       Negative  "CT angiogram of the chest.     D:  02/02/2017  E:  02/02/2017    This report was finalized on 2/3/2017 9:53 AM by Dr. Aniket Singh MD.           Condition on Discharge: Stable, improved    Physical Exam on Discharge:  Visit Vitals   • /56   • Pulse 59   • Temp 97.7 °F (36.5 °C)   • Resp 20   • Ht 70\" (177.8 cm)   • Wt 148 lb 11.2 oz (67.4 kg)   • SpO2 92%   • BMI 21.34 kg/m2     Physical Exam  Constitutional: no acute distress, awake, alert  Eyes: PERRLA, sclerae anicteric, no conjunctival injection  Neck: supple, no thyromegaly, trachea midline  Respiratory: Normal effort, trace wheezing L > R, nonlabored respirations   Cardiovascular: RRR, no murmurs, rubs, or gallops, palpable pedal pulses bilaterally  Gastrointestinal: Positive bowel sounds, soft, nontender, nondistended  Musculoskeletal: No bilateral ankle edema, no clubbing or cyanosis to bilateral lower extremities  Psychiatric: oriented x 3, appropriate affect, cooperative  Neurologic: Strength symmetric in all extremities, Cranial Nerves grossly intact to confrontation     Discharge Disposition  Home or Self Care    Discharge Medications   Pavel Reyes   Home Medication Instructions GARDENIA:[id number]    Printed on:02/04/17 5436   Medication Information                      aspirin 81 MG EC tablet  Take 1 tablet by mouth Daily             atorvastatin (LIPITOR) 40 MG tablet  Take 1 tablet by mouth Daily             clopidogrel (PLAVIX) 75 MG tablet  Take 1 tablet by mouth Daily             lisinopril (PRINIVIL,ZESTRIL) 20 MG tablet  Take 20 mg by mouth Daily.             nitroglycerin (NITROSTAT) 0.4 MG SL tablet  Place 1 under the tongue as needed for angina, may repeat every 5 minutes for up three doses             omeprazole (priLOSEC) 20 MG capsule  Take 20 mg by mouth Daily.             predniSONE (DELTASONE) 10 MG tablet  Take 3 tablets by mouth Daily With Breakfast. 30mg (3 tab)x 2 days, 20mg (2 tab) x 2 days 10mg (1tab) x 2 days           "     Discharge Diet:   Diet Instructions     Diet: Regular, Cardiac, Consistent Carbohydrate; Thin Liquids, No Restrictions       Discharge Diet:   Regular  Cardiac  Consistent Carbohydrate      Fluid Consistency:  Thin Liquids, No Restrictions       Diet: Regular, Cardiac, Consistent Carbohydrate; Thin Liquids, No Restrictions       Discharge Diet:   Regular  Cardiac  Consistent Carbohydrate      Fluid Consistency:  Thin Liquids, No Restrictions               Discharge Care Plan / Instructions:  D/C to home with family, return to [place] when you feel you are able  Follow up with your PCP upon return to [place] and at 6 week for lipid panel and CMP (re: statin initiation)  Please establish care with a cardiologist, follow up in 6 weeks or as soon as possible    Activity at Discharge:   Activity Instructions     Activity as Tolerated           Activity as Tolerated                   Follow-up Appointments  No future appointments.  Referrals and Follow-ups to Schedule     Follow-Up    As directed    Follow up with PCP in 1 week    Follow up with cardiologist in 4-6 weeks       Follow-Up    As directed    PCP follow up with Lipids/CMP in 6 weeks  Please establish care with a cardiologist and follow up in 6 weeks or as soon as possible               ELIAS Milian 02/04/17 10:48 AM    Time: Discharge 45 min    Please note that portions of this note may have been completed with a voice recognition program. Efforts were made to edit the dictations, but occasionally words are mistranscribed.

## 2017-02-04 NOTE — PROGRESS NOTES
Continued Stay Note  [facility]     Patient Name: Pavel Reyes  MRN: [id number]  Today's Date: 2/4/2017    Admit Date: 2/1/2017          Discharge Plan       02/04/17 1007    Case Management/Social Work Plan    Plan home O2    Patient/Family In Agreement With Plan yes    Additional Comments Pt lives in [place], but he is [place] w [place] daughter to [place] for [place] wk, pt needs a DME company that services [facility] in order to deliver it to pt for him to use all week, but also a coorosponding company in [place]. Pt investigated companys in his state and area. [employer] was the one pt chose that had 2 stores in both areas.  CM contacted [employer]ud[employer] w the O2 referral.  CM faxed O2 order and requested information to [phone]. CM made a hard copy of requested in information as [employer] asked for it.  O2 w portable tank and concentrator will be delivered to pt room today prior to d/c.  [employer] is bringing enough for pt to have until he returns to [place] after next week.  Then pt will need to contact [employer] in [place] to fufill the remaining O2 needed at that time. No other needs at this time. CM will continue to follow.              Discharge Codes     None        Expected Discharge Date and Time     Expected Discharge Date Expected Discharge Time    Feb 3, 2017             Selina Huggins RN

## 2017-02-04 NOTE — PLAN OF CARE
Problem: Patient Care Overview (Adult)  Goal: Plan of Care Review    02/04/17 0416   Coping/Psychosocial Response Interventions   Plan Of Care Reviewed With patient   Patient Care Overview   Progress improving   Outcome Evaluation   Outcome Summary/Follow up Plan Weaned off O2 when awake, but when went to sleep O2 sat dropped to 85% consistently so replaced 2L bnc .

## 2017-02-04 NOTE — PROGRESS NOTES
Continued Stay Note  [facility]     Patient Name: Pavel Reyes  MRN: [id number]  Today's Date: 2/4/2017    Admit Date: 2/1/2017          Discharge Plan       02/04/17 1252    Case Management/Social Work Plan    Plan Social work assisted with medication for Mr. Reyes through the indigent fund.  Social work called [employer] to explain that Mr. Reyes may not have a $20 copay and they will bill him for oxygen.    Patient/Family In Agreement With Plan yes      02/04/17 1007    Case Management/Social Work Plan    Plan home O2    Patient/Family In Agreement With Plan yes    Additional Comments [place] lives [place], but he is [place] w [place] to [place] for [place], pt needs a DME company that services [facility] in order to deliver it to pt for him to use all week, but also a coorosponding company in [place]. Pt investigated companys in his state and area. [employer] was the one pt chose that had 2 stores in both areas.  CM contacted [employer]ud[employer] w the O2 referral.  CM faxed O2 order and requested information to [phone]. GINA made a hard copy of requested in information as Chino asked for it.  O2 w portable tank and concentrator will be delivered to pt room.  [employer] is brining enough for pt to have until he returns to [place] after next week.  Then pt will need to contact [employer] in [place] to fufill the remaining O2 needed at that time. No other needs at this time. CM will continue to follow.              Discharge Codes     None        Expected Discharge Date and Time     Expected Discharge Date Expected Discharge Time    Feb 3, 2017             JALEN Torre

## 2017-02-16 ENCOUNTER — PREP FOR SURGERY (OUTPATIENT)
Dept: CARDIOLOGY | Facility: CLINIC | Age: 82
End: 2017-02-16

## 2017-02-16 ENCOUNTER — TELEPHONE (OUTPATIENT)
Dept: CARDIOLOGY | Facility: CLINIC | Age: 82
End: 2017-02-16

## 2017-02-16 DIAGNOSIS — I25.110 CORONARY ARTERY DISEASE INVOLVING NATIVE CORONARY ARTERY OF NATIVE HEART WITH UNSTABLE ANGINA PECTORIS (HCC): Primary | ICD-10-CM

## 2017-02-16 RX ORDER — SODIUM CHLORIDE 0.9 % (FLUSH) 0.9 %
1-10 SYRINGE (ML) INJECTION AS NEEDED
Status: CANCELLED | OUTPATIENT
Start: 2017-02-16

## 2017-02-16 RX ORDER — ASPIRIN 81 MG/1
325 TABLET ORAL DAILY
Status: CANCELLED | OUTPATIENT
Start: 2017-02-17

## 2017-02-16 RX ORDER — CLOPIDOGREL BISULFATE 75 MG/1
75 TABLET ORAL ONCE
Status: CANCELLED | OUTPATIENT
Start: 2017-02-16

## 2017-02-16 RX ORDER — CLOPIDOGREL BISULFATE 75 MG/1
75 TABLET ORAL DAILY
Status: CANCELLED | OUTPATIENT
Start: 2017-02-17

## 2017-02-16 RX ORDER — ASPIRIN 325 MG
325 TABLET ORAL ONCE
Status: CANCELLED | OUTPATIENT
Start: 2017-02-16 | End: 2017-02-16

## 2017-02-16 NOTE — TELEPHONE ENCOUNTER
I left a message on the patient's voice mail.  Mr. Reyes underwent a PCI of his left main and LAD.  The results of the PCI appeared excellent.  I was somewhat concerned that he may have other causes for his chest pain other than his coronary artery disease.  However at this juncture, I think we must repeat a coronary angiogram to prove there is no issues in the left main.  If the angiogram is okay, we will need to advise the patient to be evaluated by a pulmonologist.    Osman Mujica MD  2/16/2017

## 2017-02-16 NOTE — TELEPHONE ENCOUNTER
Patient called today c/o chest tightness, no pain for 3 days. He says he can not do any activities without stopping d/t the tightness. He tried taking nitro x1 with no relief. Had a cath 2/2/17 with MENDEL to the LAD. He says this chest tightness is different than what he experienced before. Did you want to try IMDUR? Or something else?

## 2017-02-17 ENCOUNTER — PREP FOR SURGERY (OUTPATIENT)
Dept: CARDIOLOGY | Facility: CLINIC | Age: 82
End: 2017-02-17

## 2017-02-17 NOTE — TELEPHONE ENCOUNTER
I spoke with patient and he agreed to a heart cath. Told him to expect a call from [facility] to set it up.

## 2017-02-20 ENCOUNTER — HOSPITAL ENCOUNTER (OUTPATIENT)
Facility: HOSPITAL | Age: 82
Discharge: HOME | End: 2017-02-20
Attending: INTERNAL MEDICINE | Admitting: INTERNAL MEDICINE

## 2017-02-20 ENCOUNTER — APPOINTMENT (OUTPATIENT)
Dept: GENERAL RADIOLOGY | Facility: HOSPITAL | Age: 82
End: 2017-02-20

## 2017-02-20 VITALS
TEMPERATURE: 97.7 F | RESPIRATION RATE: 16 BRPM | SYSTOLIC BLOOD PRESSURE: 108 MMHG | OXYGEN SATURATION: 96 % | DIASTOLIC BLOOD PRESSURE: 77 MMHG | HEIGHT: 68 IN | BODY MASS INDEX: 21.65 KG/M2 | HEART RATE: 54 BPM | WEIGHT: 142.86 LBS

## 2017-02-20 DIAGNOSIS — I20.0 UNSTABLE ANGINA (HCC): ICD-10-CM

## 2017-02-20 DIAGNOSIS — I25.110 CORONARY ARTERY DISEASE INVOLVING NATIVE CORONARY ARTERY OF NATIVE HEART WITH UNSTABLE ANGINA PECTORIS (HCC): ICD-10-CM

## 2017-02-20 DIAGNOSIS — I10 ESSENTIAL HYPERTENSION: Primary | ICD-10-CM

## 2017-02-20 DIAGNOSIS — J43.9 PULMONARY EMPHYSEMA, UNSPECIFIED EMPHYSEMA TYPE (HCC): ICD-10-CM

## 2017-02-20 PROBLEM — J44.9 COPD (CHRONIC OBSTRUCTIVE PULMONARY DISEASE) (HCC): Status: ACTIVE | Noted: 2017-02-01

## 2017-02-20 LAB
ANION GAP SERPL CALCULATED.3IONS-SCNC: 0 MMOL/L (ref 3–11)
BUN BLD-MCNC: 26 MG/DL (ref 9–23)
BUN/CREAT SERPL: 23.6 (ref 7–25)
CALCIUM SPEC-SCNC: 9.4 MG/DL (ref 8.7–10.4)
CHLORIDE SERPL-SCNC: 104 MMOL/L (ref 99–109)
CO2 SERPL-SCNC: 34 MMOL/L (ref 20–31)
CREAT BLD-MCNC: 1.1 MG/DL (ref 0.6–1.3)
DEPRECATED RDW RBC AUTO: 46.7 FL (ref 37–54)
ERYTHROCYTE [DISTWIDTH] IN BLOOD BY AUTOMATED COUNT: 13.9 % (ref 11.3–14.5)
GFR SERPL CREATININE-BSD FRML MDRD: 64 ML/MIN/1.73
GLUCOSE BLD-MCNC: 91 MG/DL (ref 70–100)
HCT VFR BLD AUTO: 40.6 % (ref 38.9–50.9)
HGB BLD-MCNC: 13 G/DL (ref 13.1–17.5)
MCH RBC QN AUTO: 29.4 PG (ref 27–31)
MCHC RBC AUTO-ENTMCNC: 32 G/DL (ref 32–36)
MCV RBC AUTO: 91.9 FL (ref 80–99)
PLATELET # BLD AUTO: 161 10*3/MM3 (ref 150–450)
PMV BLD AUTO: 10.3 FL (ref 6–12)
POTASSIUM BLD-SCNC: 4.7 MMOL/L (ref 3.5–5.5)
RBC # BLD AUTO: 4.42 10*6/MM3 (ref 4.2–5.76)
SODIUM BLD-SCNC: 138 MMOL/L (ref 132–146)
WBC NRBC COR # BLD: 3.91 10*3/MM3 (ref 3.5–10.8)

## 2017-02-20 PROCEDURE — C1769 GUIDE WIRE: HCPCS | Performed by: INTERNAL MEDICINE

## 2017-02-20 PROCEDURE — 93458 L HRT ARTERY/VENTRICLE ANGIO: CPT | Performed by: INTERNAL MEDICINE

## 2017-02-20 PROCEDURE — 80048 BASIC METABOLIC PNL TOTAL CA: CPT | Performed by: INTERNAL MEDICINE

## 2017-02-20 PROCEDURE — 25010000002 HEPARIN (PORCINE) PER 1000 UNITS: Performed by: INTERNAL MEDICINE

## 2017-02-20 PROCEDURE — 25010000002 MIDAZOLAM PER 1 MG: Performed by: INTERNAL MEDICINE

## 2017-02-20 PROCEDURE — 93005 ELECTROCARDIOGRAM TRACING: CPT | Performed by: INTERNAL MEDICINE

## 2017-02-20 PROCEDURE — 36415 COLL VENOUS BLD VENIPUNCTURE: CPT

## 2017-02-20 PROCEDURE — 25010000002 FENTANYL CITRATE (PF) 100 MCG/2ML SOLUTION: Performed by: INTERNAL MEDICINE

## 2017-02-20 PROCEDURE — 71010 HC CHEST PA OR AP: CPT

## 2017-02-20 PROCEDURE — 63710000001 CLOPIDOGREL 75 MG TABLET: Performed by: INTERNAL MEDICINE

## 2017-02-20 PROCEDURE — A9270 NON-COVERED ITEM OR SERVICE: HCPCS | Performed by: INTERNAL MEDICINE

## 2017-02-20 PROCEDURE — 85027 COMPLETE CBC AUTOMATED: CPT | Performed by: INTERNAL MEDICINE

## 2017-02-20 PROCEDURE — S0260 H&P FOR SURGERY: HCPCS | Performed by: INTERNAL MEDICINE

## 2017-02-20 PROCEDURE — 0 IOPAMIDOL PER 1 ML: Performed by: INTERNAL MEDICINE

## 2017-02-20 PROCEDURE — 63710000001 ASPIRIN 325 MG TABLET: Performed by: INTERNAL MEDICINE

## 2017-02-20 PROCEDURE — C1894 INTRO/SHEATH, NON-LASER: HCPCS | Performed by: INTERNAL MEDICINE

## 2017-02-20 RX ORDER — HEPARIN SODIUM 1000 [USP'U]/ML
INJECTION, SOLUTION INTRAVENOUS; SUBCUTANEOUS AS NEEDED
Status: DISCONTINUED | OUTPATIENT
Start: 2017-02-20 | End: 2017-02-20 | Stop reason: HOSPADM

## 2017-02-20 RX ORDER — SODIUM CHLORIDE 9 MG/ML
INJECTION, SOLUTION INTRAVENOUS CONTINUOUS PRN
Status: DISCONTINUED | OUTPATIENT
Start: 2017-02-20 | End: 2017-02-20 | Stop reason: HOSPADM

## 2017-02-20 RX ORDER — CLOPIDOGREL BISULFATE 75 MG/1
75 TABLET ORAL DAILY
Status: DISCONTINUED | OUTPATIENT
Start: 2017-02-21 | End: 2017-02-20 | Stop reason: HOSPADM

## 2017-02-20 RX ORDER — SODIUM CHLORIDE 9 MG/ML
5 INJECTION, SOLUTION INTRAVENOUS CONTINUOUS
Status: ACTIVE | OUTPATIENT
Start: 2017-02-20 | End: 2017-02-20

## 2017-02-20 RX ORDER — MIDAZOLAM HYDROCHLORIDE 1 MG/ML
INJECTION INTRAMUSCULAR; INTRAVENOUS AS NEEDED
Status: DISCONTINUED | OUTPATIENT
Start: 2017-02-20 | End: 2017-02-20 | Stop reason: HOSPADM

## 2017-02-20 RX ORDER — SODIUM CHLORIDE 0.9 % (FLUSH) 0.9 %
1-10 SYRINGE (ML) INJECTION AS NEEDED
Status: DISCONTINUED | OUTPATIENT
Start: 2017-02-20 | End: 2017-02-20 | Stop reason: HOSPADM

## 2017-02-20 RX ORDER — CLOPIDOGREL BISULFATE 75 MG/1
75 TABLET ORAL ONCE
Status: COMPLETED | OUTPATIENT
Start: 2017-02-20 | End: 2017-02-20

## 2017-02-20 RX ORDER — LIDOCAINE HYDROCHLORIDE 10 MG/ML
INJECTION, SOLUTION INFILTRATION; PERINEURAL AS NEEDED
Status: DISCONTINUED | OUTPATIENT
Start: 2017-02-20 | End: 2017-02-20 | Stop reason: HOSPADM

## 2017-02-20 RX ORDER — ASPIRIN 325 MG
325 TABLET, DELAYED RELEASE (ENTERIC COATED) ORAL DAILY
Status: DISCONTINUED | OUTPATIENT
Start: 2017-02-21 | End: 2017-02-20 | Stop reason: HOSPADM

## 2017-02-20 RX ORDER — ASPIRIN 325 MG
325 TABLET ORAL ONCE
Status: COMPLETED | OUTPATIENT
Start: 2017-02-20 | End: 2017-02-20

## 2017-02-20 RX ORDER — FENTANYL CITRATE 50 UG/ML
INJECTION, SOLUTION INTRAMUSCULAR; INTRAVENOUS AS NEEDED
Status: DISCONTINUED | OUTPATIENT
Start: 2017-02-20 | End: 2017-02-20 | Stop reason: HOSPADM

## 2017-02-20 RX ORDER — LISINOPRIL 10 MG/1
10 TABLET ORAL DAILY
Qty: 30 TABLET | Refills: 11 | Status: SHIPPED | OUTPATIENT
Start: 2017-02-20 | End: 2018-02-15

## 2017-02-20 RX ADMIN — ASPIRIN 325 MG ORAL TABLET 325 MG: 325 PILL ORAL at 08:09

## 2017-02-20 RX ADMIN — CLOPIDOGREL 75 MG: 75 TABLET, FILM COATED ORAL at 08:09

## 2017-02-20 RX ADMIN — SODIUM CHLORIDE 5 ML/KG/HR: 9 INJECTION, SOLUTION INTRAVENOUS at 09:51

## 2017-02-20 NOTE — H&P
[facility]  IP Progress Note      Chief Complaint/Reason for service:    · Unstable angina         Mr. 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  Since being in [place], he is noticed substernal chest tightness which is related to exertion and improved at night when he is relaxing.  He was admitted to [facility] earlier this month.  He underwent cardiac catheterization and stenting of his left main/LAD for a hemodynamically significant stenosis.  The patient was discharged home but has had recurrence of exertional chest tightness.      Past medical, surgical, social and family history reviewed in the patient's electronic medical record.    Review of Systems:   Review of Systems - Negative except exertional chest tightness and SOB         Vital Sign Min/Max for last 24 hours  Temp  Min: 97.7 °F (36.5 °C)  Max: 97.7 °F (36.5 °C)   BP  Min: 131/88  Max: 131/88   Pulse  Min: 52  Max: 52   Resp  Min: 18  Max: 18   SpO2  Min: 93 %  Max: 93 %   No Data Recorded    No intake or output data in the 24 hours ending 02/20/17 0810        Physical Exam   Constitutional: He is oriented to person, place, and time. He appears well-developed and well-nourished.   HENT:   Head: Normocephalic and atraumatic.   Cardiovascular: Normal rate and regular rhythm.    No murmur heard.  Pulses:       Radial pulses are 2+ on the right side, and 2+ on the left side.   Left Rusty's is nonischemic   Pulmonary/Chest: Effort normal. He has decreased breath sounds. He has no wheezes.   Abdominal: Soft.   Neurological: He is oriented to person, place, and time.   Skin: Skin is warm and dry.   Psychiatric: He has a normal mood and affect. His behavior is normal.       Results Review:   I reviewed the patient's recent labs in the electronic medical record.      EKG:  Sinus bradycardia.  Normal EKG    Tele:  sinus         Hospital  Problem List     Coronary artery disease involving native coronary artery of native heart with unstable angina pectoris    Overview Addendum 2/20/2017  8:10 AM by Osman Mujica MD     · Cardiac catheterization for unstable angina (2/2/17): Hemodynamically significant distal left main/LAD disease status post MENDEL to the left main/LAD (Xience 2.75 x 38 mm).  Left main postdilated to 4.0 mm.  Normal LVEF.  · Recurrent exertional chest discomfort concerning for unstable angina versus COPD, 2/17/17         COPD (chronic obstructive pulmonary disease)    Essential hypertension    Hyperlipidemia LDL goal <70    Overview Signed 2/20/2017  8:10 AM by Osman Mujica MD     · High intensity statin therapy indicated given the presence of coronary artery disease         Type 2 diabetes mellitus        It was unclear earlier this month and it is unclear now how much of his symptoms are related to COPD versus coronary artery disease.  I have recommended that he return to the Cath Lab to evaluate his left main stent given its critical location and jailing of the left circumflex, which at the time of his PCI appeared unaffected by the stent.         · Relook coronary angiography with possible PCI via the left radial approach.    Osman Mujica MD  2/20/2017

## 2017-02-20 NOTE — PLAN OF CARE
Problem: Patient Care Overview (Adult)  Goal: Plan of Care Review  Outcome: Ongoing (interventions implemented as appropriate)    02/20/17 0720   Coping/Psychosocial Response Interventions   Plan Of Care Reviewed With patient   Patient Care Overview   Progress progress toward functional goals as expected

## 2017-02-21 ENCOUNTER — DOCUMENTATION (OUTPATIENT)
Dept: CARDIAC REHAB | Facility: HOSPITAL | Age: 82
End: 2017-02-21

## 2017-02-21 NOTE — PROGRESS NOTES
Referral received for Phase II Cardiac Rehab.  Staff reviewed chart and patient has qualifying diagnosis for Phase II Cardiac Rehab. Staff discussed benefits of exercise, program protocol, and educational material provided. Teach back verified.  Permission granted from patient for staff to fax referral information to outlying program at this time.  Staff to fax referral info to [place], [place].

## (undated) DEVICE — A2000 MULTI-USE SYRINGE KIT, P/N 701277-003KIT CONTENTS: 100ML CONTRAST RESERVOIR AND TUBING WITH CONTRAST SPIKE AND CLAMP: Brand: A2000 MULTI-USE SYRINGE KIT

## (undated) DEVICE — CATH DIAG EXPO M/ PK 6FR FL4/FR4 PIG 3PK

## (undated) DEVICE — NC TREK CORONARY DILATATION CATHETER 3.0 MM X 12 MM / RAPID-EXCHANGE: Brand: NC TREK

## (undated) DEVICE — ST EXT IV SMARTSITE 2VLV SP M LL 5ML IV1

## (undated) DEVICE — NC TREK CORONARY DILATATION CATHETER 4.0 MM X 8 MM / RAPID-EXCHANGE: Brand: NC TREK

## (undated) DEVICE — MODEL BT2000 P/N 700287-012KIT CONTENTS: MANIFOLD WITH SALINE AND CONTRAST PORTS, SALINE TUBING WITH SPIKE AND HAND SYRINGE, TRANSDUCER: Brand: BT2000 AUTOMATED MANIFOLD KIT

## (undated) DEVICE — ST INF PRI SMRTSTE 20DRP 2VLV 24ML 117

## (undated) DEVICE — PK CATH CARD 10

## (undated) DEVICE — KT VLV HEMO MAP ACC PLS LG/BORE MTL/INTRO W/TORQ/DEV

## (undated) DEVICE — CANNULA,ADULT,SOFT-TOUCH,7'TUBE,UC: Brand: PENDING

## (undated) DEVICE — GW PRESSUREWIRE AERIS W/ AGILE TP 175CM

## (undated) DEVICE — NC TREK CORONARY DILATATION CATHETER 2.5 MM X 20 MM / RAPID-EXCHANGE: Brand: NC TREK

## (undated) DEVICE — Device

## (undated) DEVICE — MODEL AT P54, P/N 700608-035KIT CONTENTS: HAND CONTROLLER, 3-WAY HIGH-PRESSURE STOPCOCK WITH ROTATING END AND PREMIUM HIGH-PRESSURE TUBING: Brand: ANGIOTOUCH® KIT

## (undated) DEVICE — GLIDESHEATH BASIC HYDROPHILIC COATED INTRODUCER SHEATH: Brand: GLIDESHEATH

## (undated) DEVICE — DEV INFL MONARCH 25W

## (undated) DEVICE — SOL NACL 0.9PCT 1000ML

## (undated) DEVICE — NC TREK CORONARY DILATATION CATHETER 3.5 MM X 8 MM / RAPID-EXCHANGE: Brand: NC TREK

## (undated) DEVICE — CATH DIAG EXPO .056 FL3.5 6F 100CM

## (undated) DEVICE — TR BAND RADIAL ARTERY COMPRESSION DEVICE: Brand: TR BAND

## (undated) DEVICE — Device: Brand: ASAHI SION BLUE

## (undated) DEVICE — CATH IMG DRAGONFLY OPTIS 2.7F 135CM

## (undated) DEVICE — GUIDE CATHETER: Brand: MACH1™